# Patient Record
Sex: FEMALE | Race: BLACK OR AFRICAN AMERICAN | NOT HISPANIC OR LATINO | Employment: OTHER | URBAN - METROPOLITAN AREA
[De-identification: names, ages, dates, MRNs, and addresses within clinical notes are randomized per-mention and may not be internally consistent; named-entity substitution may affect disease eponyms.]

---

## 2023-03-24 LAB
ALBUMIN (G/DL) IN SER/PLAS: 4.3 G/DL (ref 3.4–5)
ANION GAP IN SER/PLAS: 10 MMOL/L (ref 10–20)
CALCIUM (MG/DL) IN SER/PLAS: 9.3 MG/DL (ref 8.6–10.6)
CARBON DIOXIDE, TOTAL (MMOL/L) IN SER/PLAS: 31 MMOL/L (ref 21–32)
CHLORIDE (MMOL/L) IN SER/PLAS: 105 MMOL/L (ref 98–107)
CREATININE (MG/DL) IN SER/PLAS: 1.08 MG/DL (ref 0.5–1.05)
ERYTHROCYTE DISTRIBUTION WIDTH (RATIO) BY AUTOMATED COUNT: 14 % (ref 11.5–14.5)
ERYTHROCYTE MEAN CORPUSCULAR HEMOGLOBIN CONCENTRATION (G/DL) BY AUTOMATED: 31.9 G/DL (ref 32–36)
ERYTHROCYTE MEAN CORPUSCULAR VOLUME (FL) BY AUTOMATED COUNT: 88 FL (ref 80–100)
ERYTHROCYTES (10*6/UL) IN BLOOD BY AUTOMATED COUNT: 4.15 X10E12/L (ref 4–5.2)
GFR FEMALE: 54 ML/MIN/1.73M2
GLUCOSE (MG/DL) IN SER/PLAS: 84 MG/DL (ref 74–99)
HEMATOCRIT (%) IN BLOOD BY AUTOMATED COUNT: 36.4 % (ref 36–46)
HEMOGLOBIN (G/DL) IN BLOOD: 11.6 G/DL (ref 12–16)
LEUKOCYTES (10*3/UL) IN BLOOD BY AUTOMATED COUNT: 5 X10E9/L (ref 4.4–11.3)
NRBC (PER 100 WBCS) BY AUTOMATED COUNT: 0 /100 WBC (ref 0–0)
PHOSPHATE (MG/DL) IN SER/PLAS: 3.1 MG/DL (ref 2.5–4.9)
PLATELETS (10*3/UL) IN BLOOD AUTOMATED COUNT: 207 X10E9/L (ref 150–450)
POTASSIUM (MMOL/L) IN SER/PLAS: 4.1 MMOL/L (ref 3.5–5.3)
SODIUM (MMOL/L) IN SER/PLAS: 142 MMOL/L (ref 136–145)
UREA NITROGEN (MG/DL) IN SER/PLAS: 15 MG/DL (ref 6–23)

## 2023-04-11 NOTE — PROGRESS NOTES
"Subjective   Patient ID: Tejal Woody is a 72 y.o. female who presents for Follow-up (6 month).    HPI   The patient is taking atorvastatin for hyperlipidemia and has no side effects from this medication.  She states that her anxiety is well- controlled on the current dose of Celexa and reports that her ambulatory home blood pressures have been good without any medications at this time. The patient's chronic kidney disease is also stable. She complains of frequent fatigue.    Review of Systems  Constitutional: + fatigue. No fever or chills  Cardiovascular: no chest pain, no palpitations and no syncope.   Respiratory: no cough, no shortness of breath during exertion and no shortness of breath at rest.   Gastrointestinal: no abdominal pain, no nausea and no vomiting.  Neuro: No Headache, no dizziness    Objective   BP 97/80   Pulse 87   Ht 1.626 m (5' 4\")   Wt 61.7 kg (136 lb)   SpO2 98%   BMI 23.34 kg/m²     Physical Exam  Constitutional: Alert and in no acute distress. Well developed, well nourished  Head and Face: Head and face: Normal.    Cardiovascular: Heart rate and rhythm were normal, normal S1 and S2. No peripheral edema.   Pulmonary: No respiratory distress. Clear bilateral breath sounds.  Musculoskeletal: Gait and station: Normal. Muscle strength/tone: Normal.   Skin: Normal skin color and pigmentation, normal skin turgor, and no rash.    Psychiatric: Judgment and insight: Intact. Mood and affect: Normal.    Lab Results   Component Value Date    WBC 5.0 03/24/2023    HGB 11.6 (L) 03/24/2023    HCT 36.4 03/24/2023     03/24/2023    CHOL 114 10/06/2022    TRIG 53 10/06/2022    HDL 43.3 10/06/2022    ALT 15 10/06/2022    AST 22 10/06/2022     03/24/2023    K 4.1 03/24/2023     03/24/2023    CREATININE 1.08 (H) 03/24/2023    BUN 15 03/24/2023    CO2 31 03/24/2023    TSH 2.91 10/06/2022    INR 1.1 10/02/2018       Electrocardiogram 12 Lead  Sinus tachycardia  Incomplete right bundle " branch block  Borderline ECG  No previous ECGs available  Confirmed by JOHN CUNNINGHAM MD ([id number]) on 10/7/2018 3:52:11 PM      Assessment/Plan   Diagnoses and all orders for this visit:  White coat syndrome without hypertension  Other hyperlipidemia  -     atorvastatin (Lipitor) 10 mg tablet; Take 1 tablet (10 mg) by mouth once daily at bedtime.  -     CBC; Future  -     Comprehensive Metabolic Panel; Future  -     Lipid Panel; Future  -     Follow Up In [facility] - PCP; Future  Anxiety  -     citalopram (CeleXA) 10 mg tablet; Take 1 tablet (10 mg) by mouth once daily.  Stage 3a chronic kidney disease  -     Albumin , Urine Random; Future  -     Vitamin B12; Future  -     Vitamin D, Total; Future  -     Follow Up In [facility] - PCP; Future  Elevated blood sugar  -     Hemoglobin A1C; Future  Abnormal thyroid exam  -     TSH with reflex to Free T4 if abnormal; Future        Dear Tejal Woody     It was my pleasure to take care of you today in the office. Below are the things we discussed today:    1. Hyperlipidemia: Continue atorvastatin.    2. Anxiety: Continue Celexa.    3. CKD: Stable.    4. Fatigue: Advised the patient to incorporate exercise into her daily routine.    5. Hypertension: Well- controlled.    Your yearly Physical is due in: October 2023  When you call the office for your yearly Physical, please ask them to inform me to order your blood work, so that you can get the fasting blood work before your appointment and we can discuss the results at your physical.      Please call me if any questions arise from now until your next visit. I will call you after I am done seeing patients. A Doctor is always available by phone when the office is closed. Please feel free to call for help with any problem that you feel shouldn't wait until the office re-opens.     Scribe Attestation  By signing my name below, IJacki Scribe   attest that this documentation has been prepared  under the direction and in the presence of Rachel Caro MD.

## 2023-04-12 ENCOUNTER — OFFICE VISIT (OUTPATIENT)
Dept: PRIMARY CARE | Facility: CLINIC | Age: 73
End: 2023-04-12
Payer: MEDICARE

## 2023-04-12 VITALS
WEIGHT: 136 LBS | OXYGEN SATURATION: 98 % | DIASTOLIC BLOOD PRESSURE: 80 MMHG | SYSTOLIC BLOOD PRESSURE: 97 MMHG | HEART RATE: 87 BPM | BODY MASS INDEX: 23.22 KG/M2 | HEIGHT: 64 IN

## 2023-04-12 DIAGNOSIS — E78.49 OTHER HYPERLIPIDEMIA: ICD-10-CM

## 2023-04-12 DIAGNOSIS — R73.9 ELEVATED BLOOD SUGAR: ICD-10-CM

## 2023-04-12 DIAGNOSIS — F41.9 ANXIETY: ICD-10-CM

## 2023-04-12 DIAGNOSIS — R94.6 ABNORMAL THYROID EXAM: ICD-10-CM

## 2023-04-12 DIAGNOSIS — N18.31 STAGE 3A CHRONIC KIDNEY DISEASE (MULTI): ICD-10-CM

## 2023-04-12 DIAGNOSIS — R03.0 WHITE COAT SYNDROME WITHOUT HYPERTENSION: Primary | ICD-10-CM

## 2023-04-12 PROBLEM — M85.80 OSTEOPENIA: Status: ACTIVE | Noted: 2023-04-12

## 2023-04-12 PROBLEM — R00.0 SINUS TACHYCARDIA: Status: ACTIVE | Noted: 2023-04-12

## 2023-04-12 PROBLEM — N18.30 STAGE 3 CHRONIC KIDNEY DISEASE (MULTI): Status: ACTIVE | Noted: 2023-04-12

## 2023-04-12 PROBLEM — N95.8 OTHER SPECIFIED MENOPAUSAL AND PERIMENOPAUSAL DISORDERS: Status: ACTIVE | Noted: 2023-04-12

## 2023-04-12 PROBLEM — M62.838 MUSCLE SPASMS OF NECK: Status: ACTIVE | Noted: 2023-04-12

## 2023-04-12 PROCEDURE — 3074F SYST BP LT 130 MM HG: CPT | Performed by: FAMILY MEDICINE

## 2023-04-12 PROCEDURE — 1159F MED LIST DOCD IN RCRD: CPT | Performed by: FAMILY MEDICINE

## 2023-04-12 PROCEDURE — 1160F RVW MEDS BY RX/DR IN RCRD: CPT | Performed by: FAMILY MEDICINE

## 2023-04-12 PROCEDURE — 99214 OFFICE O/P EST MOD 30 MIN: CPT | Performed by: FAMILY MEDICINE

## 2023-04-12 PROCEDURE — 3079F DIAST BP 80-89 MM HG: CPT | Performed by: FAMILY MEDICINE

## 2023-04-12 PROCEDURE — 1036F TOBACCO NON-USER: CPT | Performed by: FAMILY MEDICINE

## 2023-04-12 RX ORDER — ACETAMINOPHEN 500 MG
TABLET ORAL
COMMUNITY

## 2023-04-12 RX ORDER — CETIRIZINE HYDROCHLORIDE 10 MG/1
TABLET ORAL
COMMUNITY
End: 2023-10-12 | Stop reason: ALTCHOICE

## 2023-04-12 RX ORDER — ATORVASTATIN CALCIUM 10 MG/1
10 TABLET, FILM COATED ORAL NIGHTLY
COMMUNITY
End: 2023-04-12 | Stop reason: SDUPTHER

## 2023-04-12 RX ORDER — ATORVASTATIN CALCIUM 10 MG/1
10 TABLET, FILM COATED ORAL NIGHTLY
Qty: 90 TABLET | Refills: 0 | Status: SHIPPED | OUTPATIENT
Start: 2023-04-12 | End: 2023-10-12 | Stop reason: SDUPTHER

## 2023-04-12 RX ORDER — CITALOPRAM 10 MG/1
10 TABLET ORAL DAILY
COMMUNITY
End: 2023-04-12 | Stop reason: SDUPTHER

## 2023-04-12 RX ORDER — CITALOPRAM 10 MG/1
10 TABLET ORAL DAILY
Qty: 90 TABLET | Refills: 0 | Status: SHIPPED | OUTPATIENT
Start: 2023-04-12 | End: 2023-10-12 | Stop reason: SDUPTHER

## 2023-04-12 RX ORDER — VITAMIN E (DL,TOCOPHERYL ACET) 45 MG/0.25
DROPS ORAL
COMMUNITY

## 2023-04-12 ASSESSMENT — PATIENT HEALTH QUESTIONNAIRE - PHQ9
SUM OF ALL RESPONSES TO PHQ9 QUESTIONS 1 AND 2: 0
1. LITTLE INTEREST OR PLEASURE IN DOING THINGS: NOT AT ALL
2. FEELING DOWN, DEPRESSED OR HOPELESS: NOT AT ALL

## 2023-04-12 ASSESSMENT — ENCOUNTER SYMPTOMS
DEPRESSION: 0
LOSS OF SENSATION IN FEET: 0
OCCASIONAL FEELINGS OF UNSTEADINESS: 0

## 2023-10-04 ENCOUNTER — LAB (OUTPATIENT)
Dept: LAB | Facility: LAB | Age: 73
End: 2023-10-04
Payer: MEDICARE

## 2023-10-04 DIAGNOSIS — N18.31 STAGE 3A CHRONIC KIDNEY DISEASE (MULTI): ICD-10-CM

## 2023-10-04 DIAGNOSIS — E78.49 OTHER HYPERLIPIDEMIA: ICD-10-CM

## 2023-10-04 DIAGNOSIS — R73.9 ELEVATED BLOOD SUGAR: ICD-10-CM

## 2023-10-04 DIAGNOSIS — R94.6 ABNORMAL THYROID EXAM: ICD-10-CM

## 2023-10-04 LAB
25(OH)D3 SERPL-MCNC: 62 NG/ML (ref 30–100)
ALBUMIN SERPL BCP-MCNC: 4.9 G/DL (ref 3.4–5)
ALP SERPL-CCNC: 83 U/L (ref 33–136)
ALT SERPL W P-5'-P-CCNC: 18 U/L (ref 7–45)
ANION GAP SERPL CALC-SCNC: 15 MMOL/L (ref 10–20)
AST SERPL W P-5'-P-CCNC: 39 U/L (ref 9–39)
BILIRUB SERPL-MCNC: 0.6 MG/DL (ref 0–1.2)
BUN SERPL-MCNC: 22 MG/DL (ref 6–23)
CALCIUM SERPL-MCNC: 9.8 MG/DL (ref 8.6–10.6)
CHLORIDE SERPL-SCNC: 104 MMOL/L (ref 98–107)
CHOLEST SERPL-MCNC: 116 MG/DL (ref 0–199)
CHOLESTEROL/HDL RATIO: 2.3
CO2 SERPL-SCNC: 29 MMOL/L (ref 21–32)
CREAT SERPL-MCNC: 1.26 MG/DL (ref 0.5–1.05)
CREAT UR-MCNC: 141.6 MG/DL (ref 20–320)
ERYTHROCYTE [DISTWIDTH] IN BLOOD BY AUTOMATED COUNT: 14.1 % (ref 11.5–14.5)
EST. AVERAGE GLUCOSE BLD GHB EST-MCNC: 126 MG/DL
GFR SERPL CREATININE-BSD FRML MDRD: 45 ML/MIN/1.73M*2
GLUCOSE SERPL-MCNC: 88 MG/DL (ref 74–99)
HBA1C MFR BLD: 6 %
HCT VFR BLD AUTO: 39.8 % (ref 36–46)
HDLC SERPL-MCNC: 50.6 MG/DL
HGB BLD-MCNC: 12.8 G/DL (ref 12–16)
LDLC SERPL CALC-MCNC: 57 MG/DL (ref 140–190)
MCH RBC QN AUTO: 27.9 PG (ref 26–34)
MCHC RBC AUTO-ENTMCNC: 32.2 G/DL (ref 32–36)
MCV RBC AUTO: 87 FL (ref 80–100)
MICROALBUMIN UR-MCNC: <7 MG/L
MICROALBUMIN/CREAT UR: NORMAL MG/G{CREAT}
NON HDL CHOLESTEROL: 65 MG/DL (ref 0–149)
NRBC BLD-RTO: 0 /100 WBCS (ref 0–0)
PLATELET # BLD AUTO: 227 X10*3/UL (ref 150–450)
PMV BLD AUTO: 11 FL (ref 7.5–11.5)
POTASSIUM SERPL-SCNC: 4.5 MMOL/L (ref 3.5–5.3)
PROT SERPL-MCNC: 7.6 G/DL (ref 6.4–8.2)
RBC # BLD AUTO: 4.59 X10*6/UL (ref 4–5.2)
SODIUM SERPL-SCNC: 143 MMOL/L (ref 136–145)
TRIGL SERPL-MCNC: 42 MG/DL (ref 0–149)
TSH SERPL-ACNC: 2.92 MIU/L (ref 0.44–3.98)
VIT B12 SERPL-MCNC: 447 PG/ML (ref 211–911)
VLDL: 8 MG/DL (ref 0–40)
WBC # BLD AUTO: 6.3 X10*3/UL (ref 4.4–11.3)

## 2023-10-04 PROCEDURE — 36415 COLL VENOUS BLD VENIPUNCTURE: CPT

## 2023-10-12 ENCOUNTER — OFFICE VISIT (OUTPATIENT)
Dept: PRIMARY CARE | Facility: CLINIC | Age: 73
End: 2023-10-12
Payer: MEDICARE

## 2023-10-12 VITALS
HEART RATE: 90 BPM | SYSTOLIC BLOOD PRESSURE: 122 MMHG | BODY MASS INDEX: 22.88 KG/M2 | OXYGEN SATURATION: 100 % | DIASTOLIC BLOOD PRESSURE: 73 MMHG | WEIGHT: 134 LBS | HEIGHT: 64 IN

## 2023-10-12 DIAGNOSIS — Z23 NEED FOR VACCINATION: ICD-10-CM

## 2023-10-12 DIAGNOSIS — Z11.59 NEED FOR HEPATITIS C SCREENING TEST: ICD-10-CM

## 2023-10-12 DIAGNOSIS — E78.49 OTHER HYPERLIPIDEMIA: ICD-10-CM

## 2023-10-12 DIAGNOSIS — Z00.00 MEDICARE ANNUAL WELLNESS VISIT, SUBSEQUENT: Primary | ICD-10-CM

## 2023-10-12 DIAGNOSIS — Z12.31 ENCOUNTER FOR SCREENING MAMMOGRAM FOR BREAST CANCER: ICD-10-CM

## 2023-10-12 DIAGNOSIS — N18.31 STAGE 3A CHRONIC KIDNEY DISEASE (MULTI): ICD-10-CM

## 2023-10-12 DIAGNOSIS — R73.03 PREDIABETES: ICD-10-CM

## 2023-10-12 DIAGNOSIS — Z00.00 ROUTINE GENERAL MEDICAL EXAMINATION AT HEALTH CARE FACILITY: ICD-10-CM

## 2023-10-12 DIAGNOSIS — F41.9 ANXIETY: ICD-10-CM

## 2023-10-12 PROCEDURE — G0008 ADMIN INFLUENZA VIRUS VAC: HCPCS | Performed by: FAMILY MEDICINE

## 2023-10-12 PROCEDURE — 99397 PER PM REEVAL EST PAT 65+ YR: CPT | Performed by: FAMILY MEDICINE

## 2023-10-12 PROCEDURE — 1036F TOBACCO NON-USER: CPT | Performed by: FAMILY MEDICINE

## 2023-10-12 PROCEDURE — 90662 IIV NO PRSV INCREASED AG IM: CPT | Performed by: FAMILY MEDICINE

## 2023-10-12 PROCEDURE — 99214 OFFICE O/P EST MOD 30 MIN: CPT | Performed by: FAMILY MEDICINE

## 2023-10-12 PROCEDURE — 1160F RVW MEDS BY RX/DR IN RCRD: CPT | Performed by: FAMILY MEDICINE

## 2023-10-12 PROCEDURE — 3078F DIAST BP <80 MM HG: CPT | Performed by: FAMILY MEDICINE

## 2023-10-12 PROCEDURE — G0439 PPPS, SUBSEQ VISIT: HCPCS | Performed by: FAMILY MEDICINE

## 2023-10-12 PROCEDURE — 3074F SYST BP LT 130 MM HG: CPT | Performed by: FAMILY MEDICINE

## 2023-10-12 PROCEDURE — 1159F MED LIST DOCD IN RCRD: CPT | Performed by: FAMILY MEDICINE

## 2023-10-12 PROCEDURE — 1170F FXNL STATUS ASSESSED: CPT | Performed by: FAMILY MEDICINE

## 2023-10-12 RX ORDER — CITALOPRAM 10 MG/1
10 TABLET ORAL DAILY
Qty: 90 TABLET | Refills: 1 | Status: SHIPPED | OUTPATIENT
Start: 2023-10-12 | End: 2024-04-12 | Stop reason: SDUPTHER

## 2023-10-12 RX ORDER — ATORVASTATIN CALCIUM 10 MG/1
10 TABLET, FILM COATED ORAL NIGHTLY
Qty: 90 TABLET | Refills: 1 | Status: SHIPPED | OUTPATIENT
Start: 2023-10-12 | End: 2024-04-12 | Stop reason: SDUPTHER

## 2023-10-12 ASSESSMENT — ACTIVITIES OF DAILY LIVING (ADL)
DRESSING: INDEPENDENT
BATHING: INDEPENDENT
TAKING_MEDICATION: INDEPENDENT
GROCERY_SHOPPING: INDEPENDENT
DOING_HOUSEWORK: INDEPENDENT
MANAGING_FINANCES: INDEPENDENT

## 2023-10-12 ASSESSMENT — ENCOUNTER SYMPTOMS
OCCASIONAL FEELINGS OF UNSTEADINESS: 0
DEPRESSION: 0
LOSS OF SENSATION IN FEET: 0

## 2023-10-12 ASSESSMENT — PATIENT HEALTH QUESTIONNAIRE - PHQ9
1. LITTLE INTEREST OR PLEASURE IN DOING THINGS: NOT AT ALL
SUM OF ALL RESPONSES TO PHQ9 QUESTIONS 1 AND 2: 0
2. FEELING DOWN, DEPRESSED OR HOPELESS: NOT AT ALL

## 2023-10-12 ASSESSMENT — COLUMBIA-SUICIDE SEVERITY RATING SCALE - C-SSRS
2. HAVE YOU ACTUALLY HAD ANY THOUGHTS OF KILLING YOURSELF?: NO
1. IN THE PAST MONTH, HAVE YOU WISHED YOU WERE DEAD OR WISHED YOU COULD GO TO SLEEP AND NOT WAKE UP?: NO

## 2023-10-12 NOTE — PROGRESS NOTES
"Subjective   Patient ID: Tejal Woody is a 73 y.o. female who presents for Medicare Annual Wellness Visit Subsequent.    HPI   The patient states that her average home blood pressure reading is 123 systolic and ranges in the 70s diastolic. She reports that she is taking atorvastatin for hyperlipidemia and Celexa for anxiety as prescribed and is tolerating them well. The patient's chronic kidney disease is stable and blood work showed that she is in the prediabetes range however, she attributes this to her increased sugar intake during the summer.    Review of Systems  Constitutional: No fever or chills, No Night Sweats  Eyes: No Blurry Vision or Eye sight problems  ENT: No Nasal Discharge, Hoarseness, sore throat  Cardiovascular: no chest pain, no palpitations and no syncope.   Respiratory: no cough, no shortness of breath during exertion and no shortness of breath at rest.   Gastrointestinal: no abdominal pain, no nausea and no vomiting.   : No vaginal discharge, burning with urination, no blood in urine or stools  Skin: No Skin rashes or Lesions  Neuro: No Headache, no dizziness or Numbness or tingling  Psych: No Anxiety, depression or sleeping problems  Heme: No Easy bleeding or brusing.     Objective   /73   Pulse 90   Ht 1.626 m (5' 4\")   Wt 60.8 kg (134 lb)   SpO2 100%   BMI 23.00 kg/m²     Physical Exam  Constitutional: Alert and in no acute distress. Well developed, well nourished.   Head and Face: Head and face: Normal.    Eyes: Normal external exam.   Ears, Nose, Mouth, and Throat: External inspection of ears and nose: Normal.  Hearing: Normal.    Neck: No neck mass was observed. Supple. Thyroid not enlarged and there were no palpable thyroid nodules.   Cardiovascular: Heart rate and rhythm were normal, normal S1 and S2. Pedal pulses: Normal. No peripheral edema.   Pulmonary: No respiratory distress. Clear bilateral breath sounds.   Musculoskeletal: No joint swelling seen, normal movements " of all extremities. Range of motion: Normal.  Muscle strength/tone: Normal.    Skin: Normal skin color and pigmentation, normal skin turgor, and no rash.   Psychiatric: Judgment and insight: Intact. Mood and affect: Normal.    Lab Results   Component Value Date    WBC 6.3 10/04/2023    HGB 12.8 10/04/2023    HCT 39.8 10/04/2023     10/04/2023    CHOL 116 10/04/2023    TRIG 42 10/04/2023    HDL 50.6 10/04/2023    ALT 18 10/04/2023    AST 39 10/04/2023     10/04/2023    K 4.5 10/04/2023     10/04/2023    CREATININE 1.26 (H) 10/04/2023    BUN 22 10/04/2023    CO2 29 10/04/2023    TSH 2.92 10/04/2023    INR 1.1 10/02/2018    HGBA1C 6.0 (H) 10/04/2023       Electrocardiogram 12 Lead  Sinus tachycardia  Incomplete right bundle branch block  Borderline ECG  No previous ECGs available  Confirmed by JOHN CUNNINGHAM MD ([id number]) on 10/7/2018 3:52:11 PM      Assessment/Plan   Diagnoses and all orders for this visit:  Medicare annual wellness visit, subsequent  Other hyperlipidemia  -     atorvastatin (Lipitor) 10 mg tablet; Take 1 tablet (10 mg) by mouth once daily at bedtime.  -     Follow Up In [facility] - PCP - Established; Future  Anxiety  -     citalopram (CeleXA) 10 mg tablet; Take 1 tablet (10 mg) by mouth once daily.  Prediabetes  -     Hemoglobin A1C; Future  Stage 3a chronic kidney disease (CMS/HCC)  -     Renal Function Panel; Future  Need for vaccination  -     Flu vaccine, high dose seasonal, preservative free  Encounter for screening mammogram for breast cancer  -     BI mammo bilateral screening tomosynthesis; Future  Need for hepatitis C screening test  -     Hepatitis C antibody; Future  Routine general medical examination at health care facility        Dear Tejal Woody     It was my pleasure to take care of you today in the office. Below are the things we discussed today:    1. 1. Immunizations: Yearly Flu shot is recommended. Given today          a: COVID: Please get booster  from the pharmacy          b: Tetanus: Up-to-date         c: Shingrix: Up-to-date         d: Pneumovax: Up-to-date         e: Prevnar: Up-to-date    2. Blood Work: Reviewed   3. Seen your dentist twice a year  4. Yearly Eye exam is recommended    5. BMI: Normal  6: Diet recommendations:   Eat Clean, Try to have as many home cooked meals as possible  Avoid processed foods which contain excess calories, sugar, and sodium.    7. Exercise recommendations:   150 minutes a week to maintain your weight     If you have to loose weight, you need a better diet and exercise plan.     8. Supplements recommended:  a - Calcium 600 mg up to twice a day to get a total of 1200 mg. Each 8 oz of milk or yogurt or 1 oz of cheese, 1 Banana, 1 serving of green Leafy vegetable has about 300 mg of Calcium, so you may subtract that amount. Calcium citrate is the only acceptable supplement to take if you take an acid suppressing medication like Prilosec; otherwise Calcium carbonate is acceptable too (It can cause Constipation).   b - Vitamin D - 2000 IU daily     9. Please get your Living will / Advance directive completed if you do not have one already. Please make sure our office has a copy of the latest one.     10. Colonoscopy: Uptodate. Last done in June 2020. No more repeats recommended   11. Mammogram: Uptodate, ordered for Nov 2023   12. PAP: Not indicated   13. DEXA: Up-to-date   14: Skin Check: Please see Dermatology once a year for a Skin Check.     15. Hyperlipidemia: Continue atorvastatin.     16. Anxiety: Continue Celexa.    17. White coat hypertension: The patient's blood pressures are stable at home.    18. CKD Stage 3: Stable. Advised the patient to avoid taking any over-the-counter Advil, Aleve, Ibuprofen and Motrin. Maintain proper oral hydration.     19. Prediabetes: A1c showed 6.0. Advised the patient to be mindful of sugar intake.      Follow up in 6 months.    Follow up in one year for a Physical. Please call the  office before your Physical to see if you need blood work completed prior to your physical.     Please call me if any questions arise from now until your next visit. I will call you after I am done seeing patients. A Doctor is always available by phone when the office is closed. Please feel free to call for help with any problem that you feel shouldn't wait until the office re-opens.     Scribe Attestation  By signing my name below, I, Nick Zuluaga   attest that this documentation has been prepared under the direction and in the presence of Rachel Caro MD.

## 2023-11-20 ENCOUNTER — HOSPITAL ENCOUNTER (OUTPATIENT)
Dept: RADIOLOGY | Facility: HOSPITAL | Age: 73
Discharge: HOME | End: 2023-11-20
Payer: MEDICARE

## 2023-11-20 DIAGNOSIS — Z12.31 ENCOUNTER FOR SCREENING MAMMOGRAM FOR BREAST CANCER: ICD-10-CM

## 2023-11-20 PROCEDURE — 77063 BREAST TOMOSYNTHESIS BI: CPT

## 2023-11-20 PROCEDURE — 77067 SCR MAMMO BI INCL CAD: CPT | Performed by: STUDENT IN AN ORGANIZED HEALTH CARE EDUCATION/TRAINING PROGRAM

## 2023-11-20 PROCEDURE — 77063 BREAST TOMOSYNTHESIS BI: CPT | Performed by: STUDENT IN AN ORGANIZED HEALTH CARE EDUCATION/TRAINING PROGRAM

## 2023-12-13 ENCOUNTER — TELEPHONE (OUTPATIENT)
Dept: PRIMARY CARE | Facility: CLINIC | Age: 73
End: 2023-12-13
Payer: MEDICARE

## 2023-12-13 DIAGNOSIS — M25.519 ACUTE SHOULDER PAIN, UNSPECIFIED LATERALITY: Primary | ICD-10-CM

## 2023-12-13 RX ORDER — TIZANIDINE 2 MG/1
2 TABLET ORAL EVERY 6 HOURS PRN
Qty: 30 TABLET | Refills: 0 | Status: SHIPPED | OUTPATIENT
Start: 2023-12-13 | End: 2023-12-22 | Stop reason: SDUPTHER

## 2023-12-22 DIAGNOSIS — M25.519 ACUTE SHOULDER PAIN, UNSPECIFIED LATERALITY: ICD-10-CM

## 2023-12-22 RX ORDER — TIZANIDINE 2 MG/1
2 TABLET ORAL EVERY 6 HOURS PRN
Qty: 30 TABLET | Refills: 0 | Status: SHIPPED | OUTPATIENT
Start: 2023-12-22 | End: 2024-04-12 | Stop reason: ALTCHOICE

## 2023-12-27 ENCOUNTER — TELEPHONE (OUTPATIENT)
Dept: PRIMARY CARE | Facility: CLINIC | Age: 73
End: 2023-12-27
Payer: MEDICARE

## 2023-12-29 ENCOUNTER — ANCILLARY PROCEDURE (OUTPATIENT)
Dept: EMERGENCY MEDICINE | Facility: HOSPITAL | Age: 73
End: 2023-12-29
Payer: MEDICARE

## 2023-12-29 ENCOUNTER — APPOINTMENT (OUTPATIENT)
Dept: RADIOLOGY | Facility: HOSPITAL | Age: 73
End: 2023-12-29
Payer: MEDICARE

## 2023-12-29 ENCOUNTER — HOSPITAL ENCOUNTER (EMERGENCY)
Facility: HOSPITAL | Age: 73
Discharge: HOME | End: 2023-12-29
Payer: MEDICARE

## 2023-12-29 VITALS
DIASTOLIC BLOOD PRESSURE: 96 MMHG | WEIGHT: 132 LBS | BODY MASS INDEX: 22.53 KG/M2 | HEIGHT: 64 IN | OXYGEN SATURATION: 99 % | SYSTOLIC BLOOD PRESSURE: 142 MMHG | TEMPERATURE: 97 F | RESPIRATION RATE: 18 BRPM | HEART RATE: 108 BPM

## 2023-12-29 DIAGNOSIS — M62.838 MUSCLE SPASM: Primary | ICD-10-CM

## 2023-12-29 LAB
ALBUMIN SERPL BCP-MCNC: 4.6 G/DL (ref 3.4–5)
ALP SERPL-CCNC: 69 U/L (ref 33–136)
ALT SERPL W P-5'-P-CCNC: 15 U/L (ref 7–45)
ANION GAP SERPL CALC-SCNC: 13 MMOL/L (ref 10–20)
APPEARANCE UR: ABNORMAL
AST SERPL W P-5'-P-CCNC: 19 U/L (ref 9–39)
ATRIAL RATE: 64 BPM
BASOPHILS # BLD AUTO: 0.06 X10*3/UL (ref 0–0.1)
BASOPHILS NFR BLD AUTO: 0.8 %
BILIRUB SERPL-MCNC: 0.5 MG/DL (ref 0–1.2)
BILIRUB UR STRIP.AUTO-MCNC: NEGATIVE MG/DL
BUN SERPL-MCNC: 23 MG/DL (ref 6–23)
CALCIUM SERPL-MCNC: 9.7 MG/DL (ref 8.6–10.6)
CARDIAC TROPONIN I PNL SERPL HS: 4 NG/L (ref 0–34)
CHLORIDE SERPL-SCNC: 102 MMOL/L (ref 98–107)
CK SERPL-CCNC: 132 U/L (ref 0–215)
CO2 SERPL-SCNC: 29 MMOL/L (ref 21–32)
COLOR UR: YELLOW
CREAT SERPL-MCNC: 1.21 MG/DL (ref 0.5–1.05)
EOSINOPHIL # BLD AUTO: 0.09 X10*3/UL (ref 0–0.4)
EOSINOPHIL NFR BLD AUTO: 1.2 %
ERYTHROCYTE [DISTWIDTH] IN BLOOD BY AUTOMATED COUNT: 13.5 % (ref 11.5–14.5)
GFR SERPL CREATININE-BSD FRML MDRD: 47 ML/MIN/1.73M*2
GLUCOSE SERPL-MCNC: 81 MG/DL (ref 74–99)
GLUCOSE UR STRIP.AUTO-MCNC: NEGATIVE MG/DL
HCT VFR BLD AUTO: 37 % (ref 36–46)
HGB BLD-MCNC: 12.9 G/DL (ref 12–16)
IMM GRANULOCYTES # BLD AUTO: 0.02 X10*3/UL (ref 0–0.5)
IMM GRANULOCYTES NFR BLD AUTO: 0.3 % (ref 0–0.9)
KETONES UR STRIP.AUTO-MCNC: ABNORMAL MG/DL
LEUKOCYTE ESTERASE UR QL STRIP.AUTO: NEGATIVE
LYMPHOCYTES # BLD AUTO: 3.24 X10*3/UL (ref 0.8–3)
LYMPHOCYTES NFR BLD AUTO: 41.7 %
MAGNESIUM SERPL-MCNC: 2.28 MG/DL (ref 1.6–2.4)
MCH RBC QN AUTO: 28.4 PG (ref 26–34)
MCHC RBC AUTO-ENTMCNC: 34.9 G/DL (ref 32–36)
MCV RBC AUTO: 82 FL (ref 80–100)
MONOCYTES # BLD AUTO: 0.69 X10*3/UL (ref 0.05–0.8)
MONOCYTES NFR BLD AUTO: 8.9 %
NEUTROPHILS # BLD AUTO: 3.67 X10*3/UL (ref 1.6–5.5)
NEUTROPHILS NFR BLD AUTO: 47.1 %
NITRITE UR QL STRIP.AUTO: NEGATIVE
NRBC BLD-RTO: 0 /100 WBCS (ref 0–0)
P AXIS: 61 DEGREES
P OFFSET: 201 MS
P ONSET: 152 MS
PH UR STRIP.AUTO: 7 [PH]
PLATELET # BLD AUTO: 171 X10*3/UL (ref 150–450)
POTASSIUM SERPL-SCNC: 4.3 MMOL/L (ref 3.5–5.3)
PR INTERVAL: 144 MS
PROT SERPL-MCNC: 7.4 G/DL (ref 6.4–8.2)
PROT UR STRIP.AUTO-MCNC: NEGATIVE MG/DL
Q ONSET: 224 MS
QRS COUNT: 10 BEATS
QRS DURATION: 98 MS
QT INTERVAL: 404 MS
QTC CALCULATION(BAZETT): 416 MS
QTC FREDERICIA: 412 MS
R AXIS: 66 DEGREES
RBC # BLD AUTO: 4.54 X10*6/UL (ref 4–5.2)
RBC # UR STRIP.AUTO: NEGATIVE /UL
SODIUM SERPL-SCNC: 140 MMOL/L (ref 136–145)
SP GR UR STRIP.AUTO: 1.02
T AXIS: 41 DEGREES
T OFFSET: 426 MS
UROBILINOGEN UR STRIP.AUTO-MCNC: <2 MG/DL
VENTRICULAR RATE: 64 BPM
WBC # BLD AUTO: 7.8 X10*3/UL (ref 4.4–11.3)

## 2023-12-29 PROCEDURE — 84484 ASSAY OF TROPONIN QUANT: CPT | Performed by: PHYSICIAN ASSISTANT

## 2023-12-29 PROCEDURE — 85025 COMPLETE CBC W/AUTO DIFF WBC: CPT | Performed by: PHYSICIAN ASSISTANT

## 2023-12-29 PROCEDURE — 71046 X-RAY EXAM CHEST 2 VIEWS: CPT | Mod: FOREIGN READ | Performed by: RADIOLOGY

## 2023-12-29 PROCEDURE — 99284 EMERGENCY DEPT VISIT MOD MDM: CPT

## 2023-12-29 PROCEDURE — 99283 EMERGENCY DEPT VISIT LOW MDM: CPT | Mod: 25

## 2023-12-29 PROCEDURE — 82550 ASSAY OF CK (CPK): CPT | Performed by: PHYSICIAN ASSISTANT

## 2023-12-29 PROCEDURE — 20552 NJX 1/MLT TRIGGER POINT 1/2: CPT | Performed by: EMERGENCY MEDICINE

## 2023-12-29 PROCEDURE — 93005 ELECTROCARDIOGRAM TRACING: CPT

## 2023-12-29 PROCEDURE — 83735 ASSAY OF MAGNESIUM: CPT | Performed by: PHYSICIAN ASSISTANT

## 2023-12-29 PROCEDURE — 2500000004 HC RX 250 GENERAL PHARMACY W/ HCPCS (ALT 636 FOR OP/ED): Performed by: PHYSICIAN ASSISTANT

## 2023-12-29 PROCEDURE — 71046 X-RAY EXAM CHEST 2 VIEWS: CPT

## 2023-12-29 PROCEDURE — 84075 ASSAY ALKALINE PHOSPHATASE: CPT | Performed by: PHYSICIAN ASSISTANT

## 2023-12-29 PROCEDURE — 2500000005 HC RX 250 GENERAL PHARMACY W/O HCPCS: Performed by: PHYSICIAN ASSISTANT

## 2023-12-29 PROCEDURE — 36415 COLL VENOUS BLD VENIPUNCTURE: CPT | Performed by: PHYSICIAN ASSISTANT

## 2023-12-29 PROCEDURE — 96360 HYDRATION IV INFUSION INIT: CPT | Mod: 59

## 2023-12-29 PROCEDURE — 20552 NJX 1/MLT TRIGGER POINT 1/2: CPT

## 2023-12-29 PROCEDURE — 81003 URINALYSIS AUTO W/O SCOPE: CPT | Performed by: PHYSICIAN ASSISTANT

## 2023-12-29 PROCEDURE — 99285 EMERGENCY DEPT VISIT HI MDM: CPT | Performed by: PHYSICIAN ASSISTANT

## 2023-12-29 RX ORDER — ACETAMINOPHEN AND CODEINE PHOSPHATE 300; 30 MG/1; MG/1
1 TABLET ORAL EVERY 6 HOURS PRN
Qty: 12 TABLET | Refills: 0 | Status: SHIPPED | OUTPATIENT
Start: 2023-12-29 | End: 2024-01-01

## 2023-12-29 RX ORDER — ACETAMINOPHEN AND CODEINE PHOSPHATE 300; 30 MG/1; MG/1
1 TABLET ORAL EVERY 6 HOURS PRN
Qty: 12 TABLET | Refills: 0 | Status: SHIPPED | OUTPATIENT
Start: 2023-12-29 | End: 2023-12-29 | Stop reason: SDUPTHER

## 2023-12-29 RX ORDER — LIDOCAINE 50 MG/G
1 PATCH TOPICAL DAILY
Qty: 14 PATCH | Refills: 0 | Status: SHIPPED | OUTPATIENT
Start: 2023-12-29 | End: 2024-04-12 | Stop reason: ALTCHOICE

## 2023-12-29 RX ORDER — ACETAMINOPHEN 325 MG/1
975 TABLET ORAL ONCE
Status: COMPLETED | OUTPATIENT
Start: 2023-12-29 | End: 2023-12-29

## 2023-12-29 RX ORDER — ORPHENADRINE CITRATE 30 MG/ML
60 INJECTION INTRAMUSCULAR; INTRAVENOUS ONCE
Status: COMPLETED | OUTPATIENT
Start: 2023-12-29 | End: 2023-12-29

## 2023-12-29 RX ORDER — LIDOCAINE 560 MG/1
1 PATCH PERCUTANEOUS; TOPICAL; TRANSDERMAL ONCE
Status: DISCONTINUED | OUTPATIENT
Start: 2023-12-29 | End: 2023-12-29 | Stop reason: HOSPADM

## 2023-12-29 RX ADMIN — ACETAMINOPHEN 975 MG: 325 TABLET ORAL at 15:20

## 2023-12-29 RX ADMIN — LIDOCAINE 1 PATCH: 4 PATCH TOPICAL at 15:20

## 2023-12-29 RX ADMIN — ORPHENADRINE CITRATE 60 MG: 60 INJECTION INTRAMUSCULAR; INTRAVENOUS at 15:20

## 2023-12-29 RX ADMIN — SODIUM CHLORIDE, POTASSIUM CHLORIDE, SODIUM LACTATE AND CALCIUM CHLORIDE 1000 ML: 600; 310; 30; 20 INJECTION, SOLUTION INTRAVENOUS at 15:20

## 2023-12-29 ASSESSMENT — COLUMBIA-SUICIDE SEVERITY RATING SCALE - C-SSRS
6. HAVE YOU EVER DONE ANYTHING, STARTED TO DO ANYTHING, OR PREPARED TO DO ANYTHING TO END YOUR LIFE?: NO
1. IN THE PAST MONTH, HAVE YOU WISHED YOU WERE DEAD OR WISHED YOU COULD GO TO SLEEP AND NOT WAKE UP?: NO
2. HAVE YOU ACTUALLY HAD ANY THOUGHTS OF KILLING YOURSELF?: NO

## 2023-12-29 ASSESSMENT — PAIN SCALES - GENERAL: PAINLEVEL_OUTOF10: 4

## 2023-12-29 ASSESSMENT — PAIN DESCRIPTION - LOCATION: LOCATION: GENERALIZED

## 2023-12-29 ASSESSMENT — PAIN - FUNCTIONAL ASSESSMENT: PAIN_FUNCTIONAL_ASSESSMENT: 0-10

## 2023-12-29 NOTE — ED PROVIDER NOTES
HPI   Chief Complaint   Patient presents with    Spasms       This is a 73-year-old female with a past medical history of CKD, hyperlipidemia who presents to the emergency department with concern for severe left periscapular pain/spasms.  Pain is intermittent worse with movement.  There is no radiation of the pain.  Also has some pain in the left arm.  Denies chest pain, shortness of breath, palpitations, nausea, vomiting, abdominal pain, diarrhea.  Was seen at urgent care for this and was prescribed Zanaflex which did not help as well as prednisone and a pain patch.  She denies recent injuries or significant physical activities including lifting or bending or reaching.                          Pawan Coma Scale Score: 15                  Patient History   Past Medical History:   Diagnosis Date    Other chest pain 10/02/2018    Atypical chest pain    Other conditions influencing health status     No significant past medical history    Personal history of other diseases of the circulatory system 03/11/2020    History of hypertension     Past Surgical History:   Procedure Laterality Date    BREAST BIOPSY Left     benign 1986    BREAST CYST EXCISION Right     1969    OOPHORECTOMY      TOTAL ABDOMINAL HYSTERECTOMY  10/17/2018    Total Abdominal Hysterectomy     Family History   Problem Relation Name Age of Onset    Lung cancer Mother      COPD Father       Social History     Tobacco Use    Smoking status: Never     Passive exposure: Past    Smokeless tobacco: Never   Vaping Use    Vaping Use: Never used   Substance Use Topics    Alcohol use: Not Currently    Drug use: Never       Physical Exam   ED Triage Vitals [12/29/23 1320]   Temp Heart Rate Resp BP   36.1 °C (97 °F) 108 18 (!) 142/96      SpO2 Temp Source Heart Rate Source Patient Position   99 % Temporal -- --      BP Location FiO2 (%)     -- --       Physical Exam  Vitals reviewed.   Constitutional:       Appearance: Normal appearance.   HENT:      Head:  Normocephalic and atraumatic.   Cardiovascular:      Rate and Rhythm: Normal rate and regular rhythm.   Pulmonary:      Effort: Pulmonary effort is normal.      Breath sounds: Normal breath sounds.   Musculoskeletal:      Comments: Tender to palpation in the left periscapular area, full range of motion of all 4 extremities, neurovascularly intact   Skin:     General: Skin is warm and dry.   Neurological:      Mental Status: She is alert.         ED Course & MDM   Diagnoses as of 12/29/23 1951   Muscle spasm       Medical Decision Making  73-year-old female, is alert and oriented x 3, afebrile, tachycardic 108, /96, temperature 97.0 °F, satting 99% on room air.    The patient has some tenderness in the left parascapular area, also endorses pain in the left arm.  She has no chest pain or shortness of breath.    Given symptoms lasting the past couple weeks, lower concern for ACS, though will obtain cardiac workup including EKG, troponin, chest x-ray as well as basic laboratory testing including CBC, CMP, magnesium, CK.    IV access established, she was given a liter of LR, Norflex and acetaminophen.  Laboratory workup was grossly unremarkable, chest x-ray without acute cardiopulmonary pathologies, EKG revealing normal sinus rhythm rate 64 bpm, normal axis, KS interval 144, QTc 416, no contiguous ST-T wave abnormalities noted.    The patient was reevaluated with not much improvement in her pain.  She has a focal area of pain and tenderness in the left trapezius.    Trigger point injection was performed by ED resident/attending with some mild improvement.  The patient is comfortable going home.  Will give her prescription for Tylenol 3 and she was advised to follow-up with her primary care provider.  I did suggest she follow-up with the specialist, such as sports medicine or orthopedics for evaluation, however the patient states that she will just try to manage it on her own and with her primary  provider.        Procedure  Procedures     Parag Tolentino PA-C  12/29/23 1953

## 2023-12-30 LAB — HOLD SPECIMEN: NORMAL

## 2023-12-30 NOTE — ED PROCEDURE NOTE
Procedure  General    Performed by: Taj Kim MD  Authorized by: Clive Rucker MD    Consent:     Consent obtained:  Verbal  Universal protocol:     Patient identity confirmed:  Verbally with patient  Indications:     Indications:  Muscle spasm  Pre-procedure details:     Procedure prep: Alcohol swab.  Sedation:     Sedation type:  None  Anesthesia:     Anesthesia method:  Local infiltration    Local anesthetic:  Lidocaine 1% WITH epi and bupivacaine 0.5% w/o epi  Procedure specific details:      Trigger point injection was performed due to muscle spasm of the left paraspinal muscles.  Patient was consented verbally, area of maximal tenderness was located via palpation.  Prepped with alcohol swab.  Injected with 50/50 mixture of 1% lidocaine with epinephrine and 0.5% bupivacaine.  Injected in a wide pattern to achieve a field block.  Patient reported mild improvement in symptoms after trigger point injections.  Counseled on expected duration of symptomatic relief.  Post-procedure details:     Procedure completion:  Tolerated               Taj Kim MD  Resident  12/29/23 [id number]

## 2024-01-10 ENCOUNTER — ANCILLARY PROCEDURE (OUTPATIENT)
Dept: RADIOLOGY | Facility: CLINIC | Age: 74
End: 2024-01-10
Payer: MEDICARE

## 2024-01-10 ENCOUNTER — OFFICE VISIT (OUTPATIENT)
Dept: PRIMARY CARE | Facility: CLINIC | Age: 74
End: 2024-01-10
Payer: MEDICARE

## 2024-01-10 VITALS
WEIGHT: 135 LBS | HEIGHT: 64 IN | DIASTOLIC BLOOD PRESSURE: 85 MMHG | HEART RATE: 82 BPM | SYSTOLIC BLOOD PRESSURE: 172 MMHG | BODY MASS INDEX: 23.05 KG/M2 | OXYGEN SATURATION: 96 %

## 2024-01-10 DIAGNOSIS — M54.12 CERVICAL RADICULOPATHY: Primary | ICD-10-CM

## 2024-01-10 DIAGNOSIS — M54.12 CERVICAL RADICULOPATHY: ICD-10-CM

## 2024-01-10 PROCEDURE — 1160F RVW MEDS BY RX/DR IN RCRD: CPT | Performed by: FAMILY MEDICINE

## 2024-01-10 PROCEDURE — 3079F DIAST BP 80-89 MM HG: CPT | Performed by: FAMILY MEDICINE

## 2024-01-10 PROCEDURE — 1036F TOBACCO NON-USER: CPT | Performed by: FAMILY MEDICINE

## 2024-01-10 PROCEDURE — 72050 X-RAY EXAM NECK SPINE 4/5VWS: CPT

## 2024-01-10 PROCEDURE — 72072 X-RAY EXAM THORAC SPINE 3VWS: CPT

## 2024-01-10 PROCEDURE — 99213 OFFICE O/P EST LOW 20 MIN: CPT | Performed by: FAMILY MEDICINE

## 2024-01-10 PROCEDURE — 1125F AMNT PAIN NOTED PAIN PRSNT: CPT | Performed by: FAMILY MEDICINE

## 2024-01-10 PROCEDURE — 3077F SYST BP >= 140 MM HG: CPT | Performed by: FAMILY MEDICINE

## 2024-01-10 PROCEDURE — 72072 X-RAY EXAM THORAC SPINE 3VWS: CPT | Performed by: RADIOLOGY

## 2024-01-10 PROCEDURE — 72050 X-RAY EXAM NECK SPINE 4/5VWS: CPT | Performed by: RADIOLOGY

## 2024-01-10 ASSESSMENT — PATIENT HEALTH QUESTIONNAIRE - PHQ9
1. LITTLE INTEREST OR PLEASURE IN DOING THINGS: NOT AT ALL
2. FEELING DOWN, DEPRESSED OR HOPELESS: NOT AT ALL
SUM OF ALL RESPONSES TO PHQ9 QUESTIONS 1 AND 2: 0

## 2024-01-10 ASSESSMENT — PAIN SCALES - GENERAL: PAINLEVEL: 7

## 2024-01-10 NOTE — PROGRESS NOTES
"Subjective   Patient ID: Tejal Woody is a 73 y.o. female who presents for Back Pain.    HPI She complains of  Shoulder left and Upper back pain for few weeks. Went to ED tried prednisone and Methocarbamol and we gave Tizanidine. The methocarbamol and lidocaine patch seems to help some. Cardiac workup in ED neg      Review of Systems  Constitutional: No fever or chills  Cardiovascular: no chest pain, no palpitations and no syncope.   Respiratory: no cough, no shortness of breath during exertion and no shortness of breath at rest.   Gastrointestinal: no abdominal pain, no nausea and no vomiting.  Neuro: No Headache, no dizziness    Objective   /85   Pulse 82   Ht 1.626 m (5' 4\")   Wt 61.2 kg (135 lb)   SpO2 96%   BMI 23.17 kg/m²     Physical Exam  Constitutional: Alert and in no acute distress. Well developed, well nourished  Head and Face: Head and face: Normal.    Cardiovascular: Heart rate and rhythm were normal, normal S1 and S2. No peripheral edema.   Pulmonary: No respiratory distress. Clear bilateral breath sounds.  Musculoskeletal: Gait and station: Normal. Muscle strength/tone: Normal.   Skin: Normal skin color and pigmentation, normal skin turgor, and no rash.    Psychiatric: Judgment and insight: Intact. Mood and affect: Normal.    Lab Results   Component Value Date    WBC 7.8 12/29/2023    HGB 12.9 12/29/2023    HCT 37.0 12/29/2023     12/29/2023    CHOL 116 10/04/2023    TRIG 42 10/04/2023    HDL 50.6 10/04/2023    ALT 15 12/29/2023    AST 19 12/29/2023     12/29/2023    K 4.3 12/29/2023     12/29/2023    CREATININE 1.21 (H) 12/29/2023    BUN 23 12/29/2023    CO2 29 12/29/2023    TSH 2.92 10/04/2023    INR 1.1 10/02/2018    HGBA1C 6.0 (H) 10/04/2023       ECG 12 lead  See ED provider note for full interpretation and clinical correlation    Normal sinus rhythm with sinus arrhythmia  Incomplete right bundle branch block  Possible Anterior infarct , age " undetermined  Abnormal ECG  When compared with ECG of 02-OCT-2018 15:29,  Vent. rate has decreased BY  54 BPM  Confirmed by Naina Vang ([id number]) on 12/29/2023 8:32:23 PM  XR chest 2 views  Narrative: STUDY:  Chest Radiographs;  12/29/2023 at 4:52 PM  INDICATION:  Left periscapular pain.  COMPARISON:  None available.  ACCESSION NUMBER(S):  [id number]  ORDERING CLINICIAN:  NORI CONLEY  TECHNIQUE:  Frontal and lateral chest.   FINDINGS:  CARDIOMEDIASTINAL SILHOUETTE:  Cardiomediastinal silhouette is normal in size and configuration.     LUNGS:  Lungs are clear.     ABDOMEN:  No remarkable upper abdominal findings.     BONES:  No acute osseous changes.  Impression: No acute process.  Signed by Gallo Enamorado MD      Assessment/Plan   Diagnoses and all orders for this visit:  Cervical radiculopathy  -     XR cervical spine complete 4-5 views; Future  -     Referral to Physical Therapy; Future  -     XR thoracic spine 3 views; Future        Dear Tejal Woody     It was my pleasure to take care of you today in the office. Below are the things we discussed today:    1. Cervical Neck pain with radiation down left arm, please get Xray and Consider Physical therapy, continue lidocaine and if not better let us know.       Follow up in 3 months    Your yearly Physical is due in: Oct 2024  When you call the office for your yearly Physical, please ask them to inform me to order your blood work, so that you can get the fasting blood work before your appointment and we can discuss the results at your physical.      Please call me if any questions arise from now until your next visit. I will call you after I am done seeing patients. A Doctor is always available by phone when the office is closed. Please feel free to call for help with any problem that you feel shouldn't wait until the office re-opens.     Rachel Caro MD

## 2024-02-12 ENCOUNTER — EVALUATION (OUTPATIENT)
Dept: PHYSICAL THERAPY | Facility: HOSPITAL | Age: 74
End: 2024-02-12
Payer: MEDICARE

## 2024-02-12 DIAGNOSIS — M54.9 UPPER BACK PAIN ON LEFT SIDE: Primary | ICD-10-CM

## 2024-02-12 DIAGNOSIS — M54.12 CERVICAL RADICULOPATHY: ICD-10-CM

## 2024-02-12 PROCEDURE — 97110 THERAPEUTIC EXERCISES: CPT | Mod: GP | Performed by: PHYSICAL THERAPIST

## 2024-02-12 PROCEDURE — 97162 PT EVAL MOD COMPLEX 30 MIN: CPT | Mod: GP | Performed by: PHYSICAL THERAPIST

## 2024-02-12 ASSESSMENT — ENCOUNTER SYMPTOMS
LOSS OF SENSATION IN FEET: 0
OCCASIONAL FEELINGS OF UNSTEADINESS: 0
DEPRESSION: 0

## 2024-02-12 NOTE — PROGRESS NOTES
Physical Therapy Evaluation and Treatment      Patient Name: Tejal Woody  MRN: [id number]  Today's Date: 2/12/2024  Time Calculation  Start Time: 0200  Stop Time: 0245  Time Calculation (min): 45 min      Assessment:  72 yo with sudden and insidious onset of L upper back pain for 2 months, x-rays with degenerative changes C spine, pt has had treatment with muscle relaxers, prednisone and trigger point injections with no relief, at time of evaluation pt displays good ROM, neg referred pain from C spine, likely muscular in lats/infrascapula region, will work on STM, posture, taping, modalities as needed to help with pain control. Pt is instructed in HEP today and agreed with plan.    Plan:  1x/week x10 weeks, treatment to include: STM to L upper back, can consider dry needling, can consider taping, can consider TENS prn.  Scapular and postural program as able.  \  PT Plan: Skilled PT  PT Frequency: 1 time per week  Duration: 10 weeks  Onset Date: 12/12/23  Certification Period Start Date: 02/12/24  Certification Period End Date: 05/12/24  Number of Treatments Authorized: SHAYLA Mustafa  Rehab Potential: Good  Plan of Care Agreement: Patient    Current Problem:   1. Upper back pain on left side  Follow Up In Physical Therapy      2. Cervical radiculopathy  Referral to Physical Therapy    Follow Up In Physical Therapy          Subjective    General:  General  Reason for Referral: cervical radiculopathy  Referred By: Rachel Caro MD  Past Medical History Relevant to Rehab: osteopenia  Precautions:  Precautions  STEADI Fall Risk Score (The score of 4 or more indicates an increased risk of falling): 0    HPI: pt states sudden onset of L side muscle spasm 12/12/23. Was prescribed zanaflex which she developed skin rash, seen by urgent care, prescribed prednisone with no relief, seen in ED. Work up neg, received injection to L upper back with no relief, provided tylenol with codeine, trigger point lidocaine patch, no  relief, seen by PCP, referred to PT.    Pt states having some tingling L elbow to L palm, pain along L upper back, pain L post elbow. Difficulty sleeping. Feels some weakness L hand.   No previous hx of upper back pain,   Pt is RHD.     IMAGING: x-ray C and T spine Moderate cervical degenerative change with multilevel neural foraminal narrowing, Mild thoracic degenerative changes with a mild scoliosis.      PMH: osteopenia.     MEDICATIONS: tylenol for pain currently.      SOCIAL HX/JOB STATUS: retired, independent with all ADLs.     BASELINE FUNCTION: walking for exercises, not walking recently due to pain and lack of energy (feel tired).     PAIN:  current 5-6/10   Worst  8/10      Best  4/10  description and location of pain: muscle spasm and ache along L upper back, along L post elbow past month, tingling L forearm to palm.    Pain aggravated by: sleeping (sidelying) with 1 pillow, carrying. Reaching overhead, reaching behind back.   Pain relieved by:  none     Vital Signs:     Pain:     Home Living:     Prior Level of Function:       Objective   OBSERVATION: slightly forward head posture.      PALPATION: most significant TTP along L post scapula along lats, and infraspinatus area.     SENSATION: intact to light touch.     NECK ROM: *Pain  Flex 50deg slight discomfort with end range overpressure  Ext 25deg with no pain  SB 25deg B with no pain   Rot 60deg B with no increased pain    SHOULDER SCREEN:  ROM: (R/L) WNL with discomfort along lats with end range flex and abd.   Shoulder and scapular strength: (R/L) WNL with no increased pain.     SPECIAL TEST:  Spurling test neg  Cervical compression neg  Cervical distraction neg  Vertebral artery test NT  Cervical joint play decreased PA glide with no increased pain.     MYOTOME SCREEN: WNL B UE.       Outcome Measures:  Other Measures  Disability of Arm Shoulder Hand (DASH): 34%     Treatments:  Treatment provided today:   Educated patient on evaluation findings and  POC, expectations and course of PT, questions addressed.   HEP: instructed pt on chest stretching, scapular retraction, shoulder abd full range overhead, handout issued, rationale explained to pt. Can use ice, heat or self STM.  Manual therapy: STM with massage gun gentle cycl on L side lats and infraspinatus area, tolerated well, with less ache after.     Activity:      OP EDUCATION:       Goals:  To be met at time of discharge:  -- Decrease pain to _<2/10_.  -- Independent with HEP.  -- ROM: able to tolerate shoulder ROM overhead with no increased pain for ease with ADLs.   -- Functional outcome: able to tolerate sleeping without waking up from pain.  Able to tolerate daily activities with reaching, carrying with pain <2/10.  Improve quick dash to <24%.

## 2024-03-01 ENCOUNTER — APPOINTMENT (OUTPATIENT)
Dept: PHYSICAL THERAPY | Facility: HOSPITAL | Age: 74
End: 2024-03-01
Payer: MEDICARE

## 2024-03-08 ENCOUNTER — TREATMENT (OUTPATIENT)
Dept: PHYSICAL THERAPY | Facility: HOSPITAL | Age: 74
End: 2024-03-08
Payer: MEDICARE

## 2024-03-08 DIAGNOSIS — M54.9 UPPER BACK PAIN ON LEFT SIDE: Primary | ICD-10-CM

## 2024-03-08 PROCEDURE — 97140 MANUAL THERAPY 1/> REGIONS: CPT | Mod: GP | Performed by: PHYSICAL THERAPIST

## 2024-03-08 PROCEDURE — 97110 THERAPEUTIC EXERCISES: CPT | Mod: GP | Performed by: PHYSICAL THERAPIST

## 2024-03-08 NOTE — PROGRESS NOTES
Physical Therapy Treatment    Patient Name: Tejal Woody  MRN: [id number]  Today's Date: 3/8/2024  Time Calculation  Start Time: 0900  Stop Time: 0940  Time Calculation (min): 40 min      Assessment:   Good improvement with pain so  far, worked on STM and progressed on scapular strengthening, tolerated well, no increased pain, pt states some L hand sx will continue to monitor.   Reviewed with pt to continue with posture stretching, expect further improvement today.     Plan:  Continue POC for STM to L upper back, scapular and shoulder strengthening as able,  can consider dry needling, taping, or TENS prn.    OP PT Plan  PT Plan: Skilled PT (visit 2)  PT Frequency: 1 time per week  Duration: 10 weeks  Onset Date: 12/12/23  Certification Period Start Date: 02/12/24  Certification Period End Date: 05/12/24  Number of Treatments Authorized: marlene [place], [place]  Rehab Potential: Good  Plan of Care Agreement: Patient    Current Problem  1. Upper back pain on left side            Subjective   General  Pt states pain a little better, still feels pain mostly along L shoulder along axilla and toward scapula, still has some tenderness along L hand, overall able to sleep better, this morning did not take tylenol for the first time  Doing her HEP, pain at 2/10 today, 5-6/10 the past week.    Reason for Referral: cervical radiculopathy  Referred By: Rachel Caro MD  Past Medical History Relevant to Rehab: osteopenia  Precautions  Precautions  STEADI Fall Risk Score (The score of 4 or more indicates an increased risk of falling): 0  Vital Signs     Pain       Objective   Slight TTP along L lats and inf angle of L scapula.     Outcome Measures:       Treatments:  Therapeutic exercises:  Pulley shoulder flex/abd x2min each  Scapular retraction 10# cable x15  Lats pulldown 10# cable x15  Lean over shoulder ext 2# x15 B  Triceps kickback 2# x15 B  Wall push up x15      Manual:  R sidelying STM along L lats, UT, periscapula and  infraspinatus.     Activity:      OP EDUCATION:       Goals:

## 2024-03-15 ENCOUNTER — TREATMENT (OUTPATIENT)
Dept: PHYSICAL THERAPY | Facility: HOSPITAL | Age: 74
End: 2024-03-15
Payer: MEDICARE

## 2024-03-15 DIAGNOSIS — M54.9 UPPER BACK PAIN ON LEFT SIDE: Primary | ICD-10-CM

## 2024-03-15 PROCEDURE — 97140 MANUAL THERAPY 1/> REGIONS: CPT | Mod: GP | Performed by: PHYSICAL THERAPIST

## 2024-03-15 PROCEDURE — 97110 THERAPEUTIC EXERCISES: CPT | Mod: GP | Performed by: PHYSICAL THERAPIST

## 2024-03-15 NOTE — PROGRESS NOTES
Physical Therapy Treatment    Patient Name: Tejal Woody  MRN: [id number]  Today's Date: 3/15/2024  Time Calculation  Start Time: 0900  Stop Time: 0940  Time Calculation (min): 40 min      Assessment:  Overall doing well with functional mobility and pain, tolerated session well. Progressed on scapular strengthening, goals partially met, will continue with self management and follow up in a month.      Plan:  Follow up prn in a month, if no follow up needed, consider this as Discharge.     OP PT Plan  PT Plan: Skilled PT (visit 3)  PT Frequency: 1 time per week  Duration: 10 weeks  Onset Date: 12/12/23  Certification Period Start Date: 02/12/24  Certification Period End Date: 05/12/24  Number of Treatments Authorized: marlene [place], [place]  Rehab Potential: Good  Plan of Care Agreement: Patient    Current Problem  1. Upper back pain on left side              Subjective   General  Pt states doing well, since yesterday with no pain, no use of tylenol.  Feels really good.      Reason for Referral: cervical radiculopathy  Referred By: Rachel Caro MD  Past Medical History Relevant to Rehab: osteopenia  Precautions  Precautions  STEADI Fall Risk Score (The score of 4 or more indicates an increased risk of falling): 0  Vital Signs     Pain       Objective   Tends to elevate shoulder with overhead activities, ableto correct with cues.    Shoulder ROM WNL with no pain overhead reaching  Palpation WNL today    Outcome Measures:       Treatments:  Therapeutic exercises:  UBE L1 3min/3min   Scapular retraction 1 1/2 plate x15  Lats pulldown 15# cable x15  Shoulder press 1 plate x15  Chest press supine 2#x15   Lean over shoulder ext 2# x15 B  Triceps kickback 2# x15 B  Push up from table x15   Biceps curl 2# x15   Reviewed posture stretching, self management, return to activities as normal, pt verbalized understanding.     Manual:  STM with massage gun to B lats, UT, levator, periscapula and infraspinatus.     Activity:      OP  EDUCATION:       Goals:  To be met at time of discharge:  -- Decrease pain to _<2/10_.  -- Independent with HEP.  -- ROM: able to tolerate shoulder ROM overhead with no increased pain for ease with ADLs.   -- Functional outcome: able to tolerate sleeping without waking up from pain.  Able to tolerate daily activities with reaching, carrying with pain <2/10.  Improve quick dash to <24%.

## 2024-04-12 ENCOUNTER — OFFICE VISIT (OUTPATIENT)
Dept: PRIMARY CARE | Facility: CLINIC | Age: 74
End: 2024-04-12
Payer: MEDICARE

## 2024-04-12 VITALS
SYSTOLIC BLOOD PRESSURE: 126 MMHG | OXYGEN SATURATION: 97 % | DIASTOLIC BLOOD PRESSURE: 74 MMHG | HEART RATE: 85 BPM | WEIGHT: 133 LBS | HEIGHT: 64 IN | BODY MASS INDEX: 22.71 KG/M2

## 2024-04-12 DIAGNOSIS — F41.9 ANXIETY: ICD-10-CM

## 2024-04-12 DIAGNOSIS — E55.9 VITAMIN D DEFICIENCY: ICD-10-CM

## 2024-04-12 DIAGNOSIS — M54.12 CERVICAL RADICULOPATHY: ICD-10-CM

## 2024-04-12 DIAGNOSIS — E78.49 OTHER HYPERLIPIDEMIA: Primary | ICD-10-CM

## 2024-04-12 DIAGNOSIS — R73.9 ELEVATED BLOOD SUGAR: ICD-10-CM

## 2024-04-12 DIAGNOSIS — R94.6 ABNORMAL THYROID EXAM: ICD-10-CM

## 2024-04-12 DIAGNOSIS — N18.31 STAGE 3A CHRONIC KIDNEY DISEASE (MULTI): ICD-10-CM

## 2024-04-12 PROCEDURE — 1159F MED LIST DOCD IN RCRD: CPT | Performed by: FAMILY MEDICINE

## 2024-04-12 PROCEDURE — 3074F SYST BP LT 130 MM HG: CPT | Performed by: FAMILY MEDICINE

## 2024-04-12 PROCEDURE — 3078F DIAST BP <80 MM HG: CPT | Performed by: FAMILY MEDICINE

## 2024-04-12 PROCEDURE — 1036F TOBACCO NON-USER: CPT | Performed by: FAMILY MEDICINE

## 2024-04-12 PROCEDURE — 1160F RVW MEDS BY RX/DR IN RCRD: CPT | Performed by: FAMILY MEDICINE

## 2024-04-12 PROCEDURE — 99214 OFFICE O/P EST MOD 30 MIN: CPT | Performed by: FAMILY MEDICINE

## 2024-04-12 PROCEDURE — G2211 COMPLEX E/M VISIT ADD ON: HCPCS | Performed by: FAMILY MEDICINE

## 2024-04-12 RX ORDER — ATORVASTATIN CALCIUM 10 MG/1
10 TABLET, FILM COATED ORAL NIGHTLY
Qty: 90 TABLET | Refills: 1 | Status: SHIPPED | OUTPATIENT
Start: 2024-04-12

## 2024-04-12 RX ORDER — CITALOPRAM 10 MG/1
10 TABLET ORAL DAILY
Qty: 90 TABLET | Refills: 1 | Status: SHIPPED | OUTPATIENT
Start: 2024-04-12

## 2024-04-12 NOTE — PROGRESS NOTES
"Subjective   Patient ID: Tejal Woody is a 73 y.o. female who presents for Follow-up (6 Month).    HPI   The patient reports that she is taking atorvastatin for hyperlipidemia and Celexa for her anxiety. She is taking these medications as prescribed and is tolerating them well. She states that her cervical radiculopathy is resolved and complains of seasonal allergies. Her chronic kidney disease is stable at this time.    Review of Systems  Constitutional: No fever or chills  Cardiovascular: no chest pain, no palpitations and no syncope.   Respiratory: no cough, no shortness of breath during exertion and no shortness of breath at rest.   Gastrointestinal: no abdominal pain, no nausea and no vomiting.  Neuro: No Headache, no dizziness    Objective   /74   Pulse 85   Ht 1.626 m (5' 4\")   Wt 60.3 kg (133 lb)   SpO2 97%   BMI 22.83 kg/m²     Physical Exam  Constitutional: Alert and in no acute distress. Well developed, well nourished  Head and Face: Head and face: Normal.    Cardiovascular: Heart rate and rhythm were normal, normal S1 and S2. No peripheral edema.   Pulmonary: No respiratory distress. Clear bilateral breath sounds.  Musculoskeletal: Gait and station: Normal. Muscle strength/tone: Normal.   Skin: Normal skin color and pigmentation, normal skin turgor, and no rash.    Psychiatric: Judgment and insight: Intact. Mood and affect: Normal.    Lab Results   Component Value Date    WBC 7.8 12/29/2023    HGB 12.9 12/29/2023    HCT 37.0 12/29/2023     12/29/2023    CHOL 116 10/04/2023    TRIG 42 10/04/2023    HDL 50.6 10/04/2023    ALT 15 12/29/2023    AST 19 12/29/2023     12/29/2023    K 4.3 12/29/2023     12/29/2023    CREATININE 1.21 (H) 12/29/2023    BUN 23 12/29/2023    CO2 29 12/29/2023    TSH 2.92 10/04/2023    INR 1.1 10/02/2018    HGBA1C 6.0 (H) 10/04/2023       XR thoracic spine 3 views, XR cervical spine complete 4-5 views  Narrative: Interpreted By:  Wilberto Mcghee, "   STUDY:  XR CERVICAL SPINE COMPLETE 4-5 VIEWS; XR THORACIC SPINE 3 VIEWS      INDICATION:  Signs/Symptoms:neck pain; Signs/Symptoms:upper back pain.      COMPARISON:  None      ACCESSION NUMBER(S):  [id number]; [id number]      ORDERING CLINICIAN:  BRANDI HINOJOSA      FINDINGS:  Moderate cervical degenerative change C3 through C7, with multilevel  neural foraminal narrowing from osteophytes.      Prevertebral soft tissues normal. Alignment normal.      Thoracic spine demonstrates mild degenerative change and mild  scoliosis.      No evidence of fracture.      Impression: Moderate cervical degenerative change with multilevel neural  foraminal narrowing.      Mild thoracic degenerative changes with a mild scoliosis.      Signed by: Wilberto Mcghee 1/11/2024 6:11 PM  Dictation workstation:   [id number]      Assessment/Plan   Diagnoses and all orders for this visit:  Other hyperlipidemia  -     Follow Up In [facility] - PCP - Established  -     atorvastatin (Lipitor) 10 mg tablet; Take 1 tablet (10 mg) by mouth once daily at bedtime.  -     Lipid Panel; Future  -     Follow Up In [facility] - PCP - Medicare Annual; Future  Stage 3a chronic kidney disease (Multi)  -     CBC; Future  -     Comprehensive Metabolic Panel; Future  -     Albumin , Urine Random; Future  Anxiety  -     citalopram (CeleXA) 10 mg tablet; Take 1 tablet (10 mg) by mouth once daily.  Cervical radiculopathy  Abnormal thyroid exam  -     TSH with reflex to Free T4 if abnormal; Future  Vitamin D deficiency  -     Vitamin B12; Future  -     Vitamin D 25-Hydroxy,Total (for eval of Vitamin D levels); Future  Elevated blood sugar  -     Hemoglobin A1C; Future        Dear Tejal Woody     It was my pleasure to take care of you today in the office. Below are the things we discussed today:    1. Hyperlipidemia: Continue atorvastatin.     2. Anxiety: Continue Celexa.    3. Cervical radiculopathy: Resolved and stable at this time.      4. Seasonal allergies: Use Flonase nasal spray.     5. CKD Stage 3: Stable. Advised the patient to avoid taking any over-the-counter Advil, Aleve, Ibuprofen and Motrin. Maintain proper oral hydration.     Your yearly Physical is due in: Oct 2024   When you call the office for your yearly Physical, please ask them to inform me to order your blood work, so that you can get the fasting blood work before your appointment and we can discuss the results at your physical.      Please call me if any questions arise from now until your next visit. I will call you after I am done seeing patients. A Doctor is always available by phone when the office is closed. Please feel free to call for help with any problem that you feel shouldn't wait until the office re-opens.     Scribe Attestation  By signing my name below, IJacki Scribe   attest that this documentation has been prepared under the direction and in the presence of Rachel Caro MD.

## 2024-04-12 NOTE — PATIENT INSTRUCTIONS
If you need labs done, please go to any  lab, no paperwork needed. If a Lipid panel is ordered, you will need to fast overnight, other blood work does not require fasting.   Lab in this building is in [address] (M-F 7:30-5:00pm and Sat 8-12pm)    Please call me if any questions arise from now until your next visit. I will call you after I am done seeing patients. A Doctor is always available by phone when the office is closed. Please feel free to call for help with any problem that you feel shouldn't wait until the office re-opens.

## 2024-04-16 ENCOUNTER — APPOINTMENT (OUTPATIENT)
Dept: PHYSICAL THERAPY | Facility: HOSPITAL | Age: 74
End: 2024-04-16
Payer: MEDICARE

## 2024-10-08 ENCOUNTER — LAB (OUTPATIENT)
Dept: LAB | Facility: LAB | Age: 74
End: 2024-10-08
Payer: MEDICARE

## 2024-10-08 DIAGNOSIS — R73.9 ELEVATED BLOOD SUGAR: ICD-10-CM

## 2024-10-08 DIAGNOSIS — E55.9 VITAMIN D DEFICIENCY: ICD-10-CM

## 2024-10-08 DIAGNOSIS — E78.49 OTHER HYPERLIPIDEMIA: ICD-10-CM

## 2024-10-08 DIAGNOSIS — N18.31 STAGE 3A CHRONIC KIDNEY DISEASE (MULTI): ICD-10-CM

## 2024-10-08 DIAGNOSIS — R94.6 ABNORMAL THYROID EXAM: ICD-10-CM

## 2024-10-08 LAB
25(OH)D3 SERPL-MCNC: 73 NG/ML (ref 30–100)
ALBUMIN SERPL BCP-MCNC: 4.5 G/DL (ref 3.4–5)
ALP SERPL-CCNC: 74 U/L (ref 33–136)
ALT SERPL W P-5'-P-CCNC: 17 U/L (ref 7–45)
ANION GAP SERPL CALC-SCNC: 13 MMOL/L (ref 10–20)
AST SERPL W P-5'-P-CCNC: 20 U/L (ref 9–39)
BILIRUB SERPL-MCNC: 0.6 MG/DL (ref 0–1.2)
BUN SERPL-MCNC: 22 MG/DL (ref 6–23)
CALCIUM SERPL-MCNC: 9.3 MG/DL (ref 8.6–10.6)
CHLORIDE SERPL-SCNC: 104 MMOL/L (ref 98–107)
CHOLEST SERPL-MCNC: 118 MG/DL (ref 0–199)
CHOLESTEROL/HDL RATIO: 2.3
CO2 SERPL-SCNC: 29 MMOL/L (ref 21–32)
CREAT SERPL-MCNC: 1.07 MG/DL (ref 0.5–1.05)
CREAT UR-MCNC: 236.5 MG/DL (ref 20–320)
EGFRCR SERPLBLD CKD-EPI 2021: 54 ML/MIN/1.73M*2
ERYTHROCYTE [DISTWIDTH] IN BLOOD BY AUTOMATED COUNT: 15 % (ref 11.5–14.5)
EST. AVERAGE GLUCOSE BLD GHB EST-MCNC: 128 MG/DL
GLUCOSE SERPL-MCNC: 85 MG/DL (ref 74–99)
HBA1C MFR BLD: 6.1 %
HCT VFR BLD AUTO: 38 % (ref 36–46)
HDLC SERPL-MCNC: 51.1 MG/DL
HGB BLD-MCNC: 12.2 G/DL (ref 12–16)
LDLC SERPL CALC-MCNC: 57 MG/DL
MCH RBC QN AUTO: 27.9 PG (ref 26–34)
MCHC RBC AUTO-ENTMCNC: 32.1 G/DL (ref 32–36)
MCV RBC AUTO: 87 FL (ref 80–100)
MICROALBUMIN UR-MCNC: 14.3 MG/L
MICROALBUMIN/CREAT UR: 6 UG/MG CREAT
NON HDL CHOLESTEROL: 67 MG/DL (ref 0–149)
NRBC BLD-RTO: 0 /100 WBCS (ref 0–0)
PLATELET # BLD AUTO: 205 X10*3/UL (ref 150–450)
POTASSIUM SERPL-SCNC: 4.1 MMOL/L (ref 3.5–5.3)
PROT SERPL-MCNC: 7.3 G/DL (ref 6.4–8.2)
RBC # BLD AUTO: 4.38 X10*6/UL (ref 4–5.2)
SODIUM SERPL-SCNC: 142 MMOL/L (ref 136–145)
TRIGL SERPL-MCNC: 51 MG/DL (ref 0–149)
TSH SERPL-ACNC: 1.96 MIU/L (ref 0.44–3.98)
VIT B12 SERPL-MCNC: 491 PG/ML (ref 211–911)
VLDL: 10 MG/DL (ref 0–40)
WBC # BLD AUTO: 5.3 X10*3/UL (ref 4.4–11.3)

## 2024-10-08 PROCEDURE — 80061 LIPID PANEL: CPT

## 2024-10-08 PROCEDURE — 82570 ASSAY OF URINE CREATININE: CPT

## 2024-10-08 PROCEDURE — 82043 UR ALBUMIN QUANTITATIVE: CPT

## 2024-10-08 PROCEDURE — 82306 VITAMIN D 25 HYDROXY: CPT

## 2024-10-08 PROCEDURE — 83036 HEMOGLOBIN GLYCOSYLATED A1C: CPT

## 2024-10-08 PROCEDURE — 85027 COMPLETE CBC AUTOMATED: CPT

## 2024-10-08 PROCEDURE — 84443 ASSAY THYROID STIM HORMONE: CPT

## 2024-10-08 PROCEDURE — 36415 COLL VENOUS BLD VENIPUNCTURE: CPT

## 2024-10-08 PROCEDURE — 82607 VITAMIN B-12: CPT

## 2024-10-08 PROCEDURE — 80053 COMPREHEN METABOLIC PANEL: CPT

## 2024-10-14 ENCOUNTER — APPOINTMENT (OUTPATIENT)
Dept: PRIMARY CARE | Facility: CLINIC | Age: 74
End: 2024-10-14
Payer: MEDICARE

## 2024-10-14 VITALS
HEART RATE: 101 BPM | WEIGHT: 135 LBS | SYSTOLIC BLOOD PRESSURE: 126 MMHG | DIASTOLIC BLOOD PRESSURE: 73 MMHG | HEIGHT: 64 IN | BODY MASS INDEX: 23.05 KG/M2 | OXYGEN SATURATION: 96 %

## 2024-10-14 DIAGNOSIS — Z78.0 ASYMPTOMATIC MENOPAUSAL STATE: ICD-10-CM

## 2024-10-14 DIAGNOSIS — E78.49 OTHER HYPERLIPIDEMIA: ICD-10-CM

## 2024-10-14 DIAGNOSIS — N18.31 STAGE 3A CHRONIC KIDNEY DISEASE (MULTI): ICD-10-CM

## 2024-10-14 DIAGNOSIS — Z12.31 ENCOUNTER FOR SCREENING MAMMOGRAM FOR BREAST CANCER: ICD-10-CM

## 2024-10-14 DIAGNOSIS — Z23 NEED FOR VACCINATION: ICD-10-CM

## 2024-10-14 DIAGNOSIS — Z00.00 MEDICARE ANNUAL WELLNESS VISIT, SUBSEQUENT: Primary | ICD-10-CM

## 2024-10-14 DIAGNOSIS — F41.9 ANXIETY: ICD-10-CM

## 2024-10-14 PROBLEM — M54.9 UPPER BACK PAIN ON LEFT SIDE: Status: RESOLVED | Noted: 2024-02-12 | Resolved: 2024-10-14

## 2024-10-14 PROCEDURE — 99214 OFFICE O/P EST MOD 30 MIN: CPT | Performed by: FAMILY MEDICINE

## 2024-10-14 PROCEDURE — 1160F RVW MEDS BY RX/DR IN RCRD: CPT | Performed by: FAMILY MEDICINE

## 2024-10-14 PROCEDURE — G0008 ADMIN INFLUENZA VIRUS VAC: HCPCS | Performed by: FAMILY MEDICINE

## 2024-10-14 PROCEDURE — 1159F MED LIST DOCD IN RCRD: CPT | Performed by: FAMILY MEDICINE

## 2024-10-14 PROCEDURE — 1123F ACP DISCUSS/DSCN MKR DOCD: CPT | Performed by: FAMILY MEDICINE

## 2024-10-14 PROCEDURE — 1170F FXNL STATUS ASSESSED: CPT | Performed by: FAMILY MEDICINE

## 2024-10-14 PROCEDURE — 3074F SYST BP LT 130 MM HG: CPT | Performed by: FAMILY MEDICINE

## 2024-10-14 PROCEDURE — 90662 IIV NO PRSV INCREASED AG IM: CPT | Performed by: FAMILY MEDICINE

## 2024-10-14 PROCEDURE — 3078F DIAST BP <80 MM HG: CPT | Performed by: FAMILY MEDICINE

## 2024-10-14 PROCEDURE — G0439 PPPS, SUBSEQ VISIT: HCPCS | Performed by: FAMILY MEDICINE

## 2024-10-14 PROCEDURE — 99397 PER PM REEVAL EST PAT 65+ YR: CPT | Performed by: FAMILY MEDICINE

## 2024-10-14 PROCEDURE — 1036F TOBACCO NON-USER: CPT | Performed by: FAMILY MEDICINE

## 2024-10-14 RX ORDER — CITALOPRAM 10 MG/1
10 TABLET ORAL DAILY
Qty: 90 TABLET | Refills: 1 | Status: SHIPPED | OUTPATIENT
Start: 2024-10-14

## 2024-10-14 RX ORDER — ATORVASTATIN CALCIUM 10 MG/1
10 TABLET, FILM COATED ORAL NIGHTLY
Qty: 90 TABLET | Refills: 1 | Status: SHIPPED | OUTPATIENT
Start: 2024-10-14

## 2024-10-14 ASSESSMENT — COLUMBIA-SUICIDE SEVERITY RATING SCALE - C-SSRS
1. IN THE PAST MONTH, HAVE YOU WISHED YOU WERE DEAD OR WISHED YOU COULD GO TO SLEEP AND NOT WAKE UP?: NO
2. HAVE YOU ACTUALLY HAD ANY THOUGHTS OF KILLING YOURSELF?: NO

## 2024-10-14 ASSESSMENT — ACTIVITIES OF DAILY LIVING (ADL)
MANAGING_FINANCES: INDEPENDENT
GROCERY_SHOPPING: INDEPENDENT
BATHING: INDEPENDENT
DOING_HOUSEWORK: INDEPENDENT
TAKING_MEDICATION: INDEPENDENT
DRESSING: INDEPENDENT

## 2024-10-14 NOTE — PROGRESS NOTES
"Subjective   Patient ID: Tejal Woody is a 74 y.o. female who presents for Medicare Annual Wellness Visit Subsequent.    HPI   The patient is taking atorvastatin for hyperlipidemia and Celexa for anxiety as prescribed and denies having side effects from them. Her cholesterol is stable at this time and she has no additional concerns.    Review of Systems  Constitutional: No fever or chills, No Night Sweats  Eyes: No Blurry Vision or Eye sight problems  ENT: No Nasal Discharge, Hoarseness, sore throat  Cardiovascular: no chest pain, no palpitations and no syncope.   Respiratory: no cough, no shortness of breath during exertion and no shortness of breath at rest.   Gastrointestinal: no abdominal pain, no nausea and no vomiting.   : No vaginal discharge, burning with urination, no blood in urine or stools  Skin: No Skin rashes or Lesions  Neuro: No Headache, no dizziness or Numbness or tingling  Psych: No Anxiety, depression or sleeping problems  Heme: No Easy bleeding or brusing.     Objective   /73   Pulse 101   Ht 1.626 m (5' 4\")   Wt 61.2 kg (135 lb)   SpO2 96%   BMI 23.17 kg/m²     Physical Exam  Constitutional: Alert and in no acute distress. Well developed, well nourished.   Head and Face: Head and face: Normal.    Eyes: Normal external exam. Pupils were equal in size, round, reactive to light (PERRL) with normal accommodation and extraocular movements intact (EOMI).   Ears, Nose, Mouth, and Throat: External inspection of ears and nose: Normal.  Hearing: Normal.  Nasal mucosa, septum, and turbinates: Normal.  Lips, teeth, and gums: Normal.  Oropharynx: Normal.   Neck: No neck mass was observed. Supple. Thyroid not enlarged and there were no palpable thyroid nodules.   Cardiovascular: Heart rate and rhythm were normal, normal S1 and S2. Pedal pulses: Normal. No peripheral edema.   Pulmonary: No respiratory distress. Clear bilateral breath sounds.   Abdomen: Soft nontender; no abdominal mass " palpated. Normal bowel sounds. No organomegaly.   Musculoskeletal: No joint swelling seen, normal movements of all extremities. Range of motion: Normal.  Muscle strength/tone: Normal.    Skin: Normal skin color and pigmentation, normal skin turgor, and no rash.   Neurologic: Deep tendon reflexes were 2+ and symmetric.   Psychiatric: Judgment and insight: Intact. Mood and affect: Normal.  Lymphatic: No cervical lymphadenopathy. Palpation of lymph nodes in axillae: Normal.  Palpation of lymph nodes in groin: Normal.    Lab Results   Component Value Date    WBC 5.3 10/08/2024    HGB 12.2 10/08/2024    HCT 38.0 10/08/2024     10/08/2024    CHOL 118 10/08/2024    TRIG 51 10/08/2024    HDL 51.1 10/08/2024    ALT 17 10/08/2024    AST 20 10/08/2024     10/08/2024    K 4.1 10/08/2024     10/08/2024    CREATININE 1.07 (H) 10/08/2024    BUN 22 10/08/2024    CO2 29 10/08/2024    TSH 1.96 10/08/2024    INR 1.1 10/02/2018    HGBA1C 6.1 (H) 10/08/2024       XR thoracic spine 3 views, XR cervical spine complete 4-5 views  Narrative: Interpreted By:  Wilberto Mcghee,   STUDY:  XR CERVICAL SPINE COMPLETE 4-5 VIEWS; XR THORACIC SPINE 3 VIEWS      INDICATION:  Signs/Symptoms:neck pain; Signs/Symptoms:upper back pain.      COMPARISON:  None      ACCESSION NUMBER(S):  [id number]; [id number]      ORDERING CLINICIAN:  BRANDI HINOJOSA      FINDINGS:  Moderate cervical degenerative change C3 through C7, with multilevel  neural foraminal narrowing from osteophytes.      Prevertebral soft tissues normal. Alignment normal.      Thoracic spine demonstrates mild degenerative change and mild  scoliosis.      No evidence of fracture.      Impression: Moderate cervical degenerative change with multilevel neural  foraminal narrowing.      Mild thoracic degenerative changes with a mild scoliosis.      Signed by: Wilberto Mcghee 1/11/2024 6:11 PM  Dictation workstation:   [id number]      Assessment/Plan   Diagnoses and all orders for  this visit:  Medicare annual wellness visit, subsequent  -     1 Year Follow Up In [facility] - PCP - Wellness Exam; Future  Other hyperlipidemia  -     Follow Up In [facility] - PCP - Medicare Annual  -     atorvastatin (Lipitor) 10 mg tablet; Take 1 tablet (10 mg) by mouth once daily at bedtime.  -     Follow Up In [facility] - PCP - Established; Future  Anxiety  -     citalopram (CeleXA) 10 mg tablet; Take 1 tablet (10 mg) by mouth once daily.  Stage 3a chronic kidney disease (Multi)  Need for vaccination  -     Flu vaccine, high dose  Asymptomatic menopausal state  -     XR DEXA bone density; Future  Encounter for screening mammogram for breast cancer  -     BI mammo bilateral screening tomosynthesis; Future        Dear Tejal Woody     It was my pleasure to take care of you today in the office. Below are the things we discussed today:    1. 1. Immunizations: Yearly Flu shot is recommended. Given today          a: COVID: Please get booster from the pharmacy          b: Tetanus: Up-to-date. Done in 2022         c: Shingrix: Up-to-date         d: RSV: Please get from the pharmacy         e: Prevnar: Up-to-date    2. Blood Work: Reviewed   3. Seen your dentist twice a year  4. Yearly Eye exam is recommended    5. BMI: Normal  6: Diet recommendations:   Eat Clean, Try to have as many home cooked meals as possible  Avoid processed foods which contain excess calories, sugar, and sodium.    7. Exercise recommendations:   150 minutes a week to maintain your weight     If you have to loose weight, you need a better diet and exercise plan.     8. Supplements recommended:  a - Calcium 600 mg up to twice a day to get a total of 1200 mg. Each 8 oz of milk or yogurt or 1 oz of cheese, 1 Banana, 1 serving of green Leafy vegetable has about 300 mg of Calcium, so you may subtract that amount. Calcium citrate is the only acceptable supplement to take if you take an acid suppressing medication  like Prilosec; otherwise Calcium carbonate is acceptable too (It can cause Constipation).   b - Vitamin D - 2000 IU daily     9. Please get your Living will / Advance directive completed if you do not have one already. Please make sure our office has a copy of the latest one.     10. Colonoscopy: Uptodate. Last done in June 2020. No more repeats needed    11. Mammogram: Uptodate, ordered for Nov 2024  12. PAP: Not indicated   13. DEXA: Ordered for Nov 2024  14: Skin Check: Please see Dermatology once a year for a Skin Check.     15. Hyperlipidemia: Stable. Continue atorvastatin.    16. Anxiety: Continue Celexa.    17. CKD stage 3: Continue to maintain proper oral hydration. We will continue to monitor.    18. Prediabetes: A1c showed 6.1. We will continue to monitor.    Follow up in 6 months.    Follow up in one year for a Physical. Please call the office before your Physical to see if you need blood work completed prior to your physical.     Please call me if any questions arise from now until your next visit. I will call you after I am done seeing patients. A Doctor is always available by phone when the office is closed. Please feel free to call for help with any problem that you feel shouldn't wait until the office re-opens.     Scribe Attestation  By signing my name below, I, Nick Zuluaga   attest that this documentation has been prepared under the direction and in the presence of Rachel Caro MD.

## 2024-10-14 NOTE — PATIENT INSTRUCTIONS
Immunizations you need to get from the pharmacy: COVId booster, RSV    Scheduling Radiology tests: [phone]    If you need labs done, please go to any  lab, no paperwork needed. If a Lipid panel is ordered, you will need to fast overnight, other blood work does not require fasting.   Lab in this building is in [address] (M-F 7:30-5:00pm and Sat 8-12pm)    Please call me if any questions arise from now until your next visit. I will call you after I am done seeing patients. A Doctor is always available by phone when the office is closed. Please feel free to call for help with any problem that you feel shouldn't wait until the office re-opens.

## 2024-12-18 ENCOUNTER — HOSPITAL ENCOUNTER (OUTPATIENT)
Dept: RADIOLOGY | Facility: CLINIC | Age: 74
Discharge: HOME | End: 2024-12-18
Payer: MEDICARE

## 2024-12-18 VITALS — WEIGHT: 132 LBS | BODY MASS INDEX: 22.66 KG/M2

## 2024-12-18 DIAGNOSIS — Z78.0 ASYMPTOMATIC MENOPAUSAL STATE: ICD-10-CM

## 2024-12-18 DIAGNOSIS — Z12.31 ENCOUNTER FOR SCREENING MAMMOGRAM FOR BREAST CANCER: ICD-10-CM

## 2024-12-18 PROCEDURE — 77067 SCR MAMMO BI INCL CAD: CPT | Performed by: RADIOLOGY

## 2024-12-18 PROCEDURE — 77063 BREAST TOMOSYNTHESIS BI: CPT

## 2024-12-18 PROCEDURE — 77063 BREAST TOMOSYNTHESIS BI: CPT | Performed by: RADIOLOGY

## 2024-12-18 PROCEDURE — 77080 DXA BONE DENSITY AXIAL: CPT | Performed by: RADIOLOGY

## 2024-12-18 PROCEDURE — 77080 DXA BONE DENSITY AXIAL: CPT

## 2025-04-14 ENCOUNTER — APPOINTMENT (OUTPATIENT)
Dept: PRIMARY CARE | Facility: CLINIC | Age: 75
End: 2025-04-14
Payer: MEDICARE

## 2025-04-14 VITALS
SYSTOLIC BLOOD PRESSURE: 126 MMHG | DIASTOLIC BLOOD PRESSURE: 72 MMHG | WEIGHT: 140 LBS | BODY MASS INDEX: 23.9 KG/M2 | OXYGEN SATURATION: 98 % | HEART RATE: 76 BPM | HEIGHT: 64 IN

## 2025-04-14 DIAGNOSIS — E78.49 OTHER HYPERLIPIDEMIA: Primary | ICD-10-CM

## 2025-04-14 DIAGNOSIS — F41.9 ANXIETY: ICD-10-CM

## 2025-04-14 DIAGNOSIS — E53.8 VITAMIN B12 DEFICIENCY: ICD-10-CM

## 2025-04-14 DIAGNOSIS — R03.0 WHITE COAT SYNDROME WITHOUT HYPERTENSION: ICD-10-CM

## 2025-04-14 DIAGNOSIS — R26.81 GAIT INSTABILITY: ICD-10-CM

## 2025-04-14 DIAGNOSIS — M81.6 LOCALIZED OSTEOPOROSIS WITHOUT CURRENT PATHOLOGICAL FRACTURE: ICD-10-CM

## 2025-04-14 DIAGNOSIS — R94.6 ABNORMAL THYROID EXAM: ICD-10-CM

## 2025-04-14 DIAGNOSIS — R73.03 PREDIABETES: ICD-10-CM

## 2025-04-14 DIAGNOSIS — E55.9 VITAMIN D DEFICIENCY: ICD-10-CM

## 2025-04-14 DIAGNOSIS — N18.31 STAGE 3A CHRONIC KIDNEY DISEASE (MULTI): ICD-10-CM

## 2025-04-14 PROCEDURE — 1160F RVW MEDS BY RX/DR IN RCRD: CPT | Performed by: FAMILY MEDICINE

## 2025-04-14 PROCEDURE — 99214 OFFICE O/P EST MOD 30 MIN: CPT | Performed by: FAMILY MEDICINE

## 2025-04-14 PROCEDURE — 1123F ACP DISCUSS/DSCN MKR DOCD: CPT | Performed by: FAMILY MEDICINE

## 2025-04-14 PROCEDURE — 1159F MED LIST DOCD IN RCRD: CPT | Performed by: FAMILY MEDICINE

## 2025-04-14 PROCEDURE — 3074F SYST BP LT 130 MM HG: CPT | Performed by: FAMILY MEDICINE

## 2025-04-14 PROCEDURE — 3078F DIAST BP <80 MM HG: CPT | Performed by: FAMILY MEDICINE

## 2025-04-14 PROCEDURE — G2211 COMPLEX E/M VISIT ADD ON: HCPCS | Performed by: FAMILY MEDICINE

## 2025-04-14 PROCEDURE — 1036F TOBACCO NON-USER: CPT | Performed by: FAMILY MEDICINE

## 2025-04-14 RX ORDER — IBANDRONATE SODIUM 150 MG/1
150 TABLET, FILM COATED ORAL
Qty: 3 TABLET | Refills: 1 | Status: SHIPPED | OUTPATIENT
Start: 2025-04-14

## 2025-04-14 RX ORDER — ATORVASTATIN CALCIUM 10 MG/1
10 TABLET, FILM COATED ORAL NIGHTLY
Qty: 90 TABLET | Refills: 1 | Status: SHIPPED | OUTPATIENT
Start: 2025-04-14

## 2025-04-14 RX ORDER — CITALOPRAM 10 MG/1
10 TABLET ORAL DAILY
Qty: 90 TABLET | Refills: 1 | Status: SHIPPED | OUTPATIENT
Start: 2025-04-14

## 2025-04-14 NOTE — PROGRESS NOTES
"Subjective   Patient ID: Tejal Woody is a 74 y.o. female who presents for Follow-up.    HPI     The patient is taking atorvastatin for hyperlipidemia, Celexa for anxiety, calcium and a vitamin D as prescribed and denies having side effects from them. Her cholesterol is stable at this time and she has no additional concerns.     She reports her blood pressure was 126/72 prior to coming to the appointment today. Denies any falls in the last year. No depression over the last few weeks.  Anxiety is well controlled.  She states she has limited ability to walk distances now and wonders if it is due to her anemia.  Reassured that her iron levels are stable on her last lab work.     Bone density in December 2024 showed osteoporosis of the spine.  It has gotten worse than previously. She was on Boniva before.     Review of Systems  Constitutional: No fever or chills  Cardiovascular: no chest pain, no palpitations and no syncope.   Respiratory: no cough, no shortness of breath during exertion and no shortness of breath at rest.   Gastrointestinal: no abdominal pain, no nausea and no vomiting.  Neuro: No Headache, no dizziness  MSK: + gait instability     Objective   /72   Pulse 76   Ht 1.626 m (5' 4\")   Wt 63.5 kg (140 lb)   SpO2 98%   BMI 24.03 kg/m²     Physical Exam  Constitutional: Alert and in no acute distress. Well developed, well nourished  Head and Face: Head and face: Normal.    Cardiovascular: Heart rate and rhythm were normal, normal S1 and S2. No peripheral edema.   Pulmonary: No respiratory distress. Clear bilateral breath sounds.  Musculoskeletal: Gait and station: Normal. Muscle strength/tone: Normal.   Skin: Normal skin color and pigmentation, normal skin turgor, and no rash.    Psychiatric: Judgment and insight: Intact. Mood and affect: Normal.      Lab Results   Component Value Date    WBC 5.3 10/08/2024    HGB 12.2 10/08/2024    HCT 38.0 10/08/2024     10/08/2024    CHOL 118 " 10/08/2024    TRIG 51 10/08/2024    HDL 51.1 10/08/2024    ALT 17 10/08/2024    AST 20 10/08/2024     10/08/2024    K 4.1 10/08/2024     10/08/2024    CREATININE 1.07 (H) 10/08/2024    BUN 22 10/08/2024    CO2 29 10/08/2024    TSH 1.96 10/08/2024    INR 1.1 10/02/2018    HGBA1C 6.1 (H) 10/08/2024       XR DEXA bone density  Narrative: Interpreted By:  Ivette Rogers,   STUDY:  DEXA BONE GURNNCM5412/18/2024 10:35 am      INDICATION:  Signs/Symptoms:screening. The patient is a 73 y/o  year old F.      COMPARISON:  11/20/2022 at [facility].  Since the DEXA machines  are different, the bone density changed can not be calculated.      ACCESSION NUMBER(S):  [id number]      ORDERING CLINICIAN:  BRANDI HINOJOSA      TECHNIQUE:  DEXA BONE DENSITY      FINDINGS:  SPINE L1-L4  Bone Mineral Density: 0.769  T-Score -2.5  Z-Score -0.8  Bone Mineral Density change vs baseline:  Not reported  Bone Mineral Density change vs previous: Not reported      LEFT FEMUR -TOTAL  Bone Mineral Density: 0.817  T-Score -1   Z-Score  -0.1  Bone Mineral Density change vs baseline: Not reported  Bone Mineral Density change vs previous: Not reported      LEFT FEMUR -NECK  Bone Mineral Density: 0.774  T-Score -0.7  Z-Score 0.3          World Health Organization (WHO) criteria for post-menopausal,   Women:  Normal:         T-score at or above -1 SD  Osteopenia:   T-score between -1 and -2.5 SD  Osteoporosis: T-score at or below -2.5 SD          10-year Fracture Risk:  Major Osteoporotic Fracture  Not reported  Hip Fracture                        Not reported      Note:  If no FRAX score is reported, it is because:  Some T-score for Spine Total or Hip Total or Femoral Neck at or  below-2.5.              Impression: DEXA:  According to World Health Organization criteria,  classification is osteoporosis.      Followup recommended in two years or sooner as clinically warranted.      All images and detailed analysis are  available on the  Radiology  PACS.      MACRO:  None      Signed by: Ivette Rogers 12/18/2024 4:56 PM  Dictation workstation:   [id number]  BI mammo bilateral screening tomosynthesis  Narrative: Interpreted By:  Ivette Rogers,   STUDY:  BI MAMMO BILATERAL SCREENING TOMOSYNTHESIS;  12/18/2024 10:15 am      ACCESSION NUMBER(S):  [id number]      ORDERING CLINICIAN:  BRANDI HINOJOSA      INDICATION:  Screening.      ,Z12.31 Encounter for screening mammogram for malignant neoplasm of  breast      COMPARISON:  11/20/2023 11/20/2022      FINDINGS:  2D and tomosynthesis images were reviewed at 1 mm slice thickness.      Density:  There are scattered areas of fibroglandular density.      No suspicious masses or calcifications are identified.      Impression: No mammographic evidence of malignancy.      BI-RADS CATEGORY:  BI-RADS Category:  1 Negative.  Recommendation:  Annual Screening.  Recommended Date:  1 Year.  Laterality:  Bilateral.              For any future breast imaging appointments, please call [phone]          MACRO:  None      Signed by: Ivette Rogers 12/18/2024 4:17 PM  Dictation workstation:   [id number]      Assessment/Plan   Assessment & Plan  Other hyperlipidemia  Continue atorvastatin.   Orders:    Follow Up In [facility] - PCP - Established    atorvastatin (Lipitor) 10 mg tablet; Take 1 tablet (10 mg) by mouth once daily at bedtime.    Lipid Panel; Future    White coat syndrome without hypertension  She has high BP, not on any medications.  Encouraged to check BP at home regularly.         Anxiety  Continue Celexa.   Orders:    citalopram (CeleXA) 10 mg tablet; Take 1 tablet (10 mg) by mouth once daily.    Stage 3a chronic kidney disease (Multi)  Continue to maintain proper oral hydration. We will continue to monitor.   Orders:    CBC; Future    Albumin-Creatinine Ratio, Urine Random; Future    Comprehensive Metabolic Panel; Future    Gait instability  Referral placed to gait  instability   Orders:    Referral to Physical Therapy; Future    Localized osteoporosis without current pathological fracture  DEXA 12/2024 showed osteoporosis of spine. Start taking Boniva 150 mg as instructed.   Orders:    ibandronate (Boniva) 150 mg tablet; Take 1 tablet (150 mg) by mouth every 30 (thirty) days. Take in morning with full glass of water on an empty stomach. No food, drink, meds, or lying down for 60 minutes after.    Prediabetes  Ordered blood work.  Orders:    Hemoglobin A1C; Future    Abnormal thyroid exam  Ordered blood work.  Orders:    TSH with reflex to Free T4 if abnormal; Future    Vitamin D deficiency  Ordered blood work.  Orders:    Vitamin D 25-Hydroxy,Total (for eval of Vitamin D levels); Future    Vitamin B12 deficiency  Ordered blood work.  Orders:    Vitamin B12; Future        Your yearly Physical is due in:  October 2025  When you call the office for your yearly Physical, please ask them to inform me to order your blood work, so that you can get the fasting blood work before your appointment and we can discuss the results at your physical.      Please call me if any questions arise from now until your next visit. I will call you after I am done seeing patients. A Doctor is always available by phone when the office is closed. Please feel free to call for help with any problem that you feel shouldn't wait until the office re-opens.     I, Rachel Caro MD, attest that this note for 4/14/2025 accurately reflects documentation that my scribe Ella Cardoza, made at my direction in my capacity as Rachel Caro MD when I treated Tejal Woody.    Scribe Attestation  By signing my name below, IElla, Juan Pabloibe   attest that this documentation has been prepared under the direction and in the presence of Rachel Caro MD.

## 2025-04-14 NOTE — ASSESSMENT & PLAN NOTE
Continue Celexa.   Orders:    citalopram (CeleXA) 10 mg tablet; Take 1 tablet (10 mg) by mouth once daily.

## 2025-04-14 NOTE — ASSESSMENT & PLAN NOTE
Continue to maintain proper oral hydration. We will continue to monitor.   Orders:    CBC; Future    Albumin-Creatinine Ratio, Urine Random; Future    Comprehensive Metabolic Panel; Future

## 2025-10-14 ENCOUNTER — APPOINTMENT (OUTPATIENT)
Dept: PRIMARY CARE | Facility: CLINIC | Age: 75
End: 2025-10-14
Payer: MEDICARE

## 2025-10-15 ENCOUNTER — APPOINTMENT (OUTPATIENT)
Dept: PRIMARY CARE | Facility: CLINIC | Age: 75
End: 2025-10-15
Payer: MEDICARE